# Patient Record
Sex: FEMALE | Race: WHITE | NOT HISPANIC OR LATINO | Employment: PART TIME | URBAN - METROPOLITAN AREA
[De-identification: names, ages, dates, MRNs, and addresses within clinical notes are randomized per-mention and may not be internally consistent; named-entity substitution may affect disease eponyms.]

---

## 2018-09-12 ENCOUNTER — TELEPHONE (OUTPATIENT)
Dept: FAMILY MEDICINE | Facility: CLINIC | Age: 28
End: 2018-09-12

## 2018-09-12 ENCOUNTER — OFFICE VISIT (OUTPATIENT)
Dept: FAMILY MEDICINE | Facility: CLINIC | Age: 28
End: 2018-09-12
Payer: OTHER GOVERNMENT

## 2018-09-12 VITALS
HEIGHT: 63 IN | HEART RATE: 72 BPM | BODY MASS INDEX: 24.34 KG/M2 | RESPIRATION RATE: 20 BRPM | SYSTOLIC BLOOD PRESSURE: 100 MMHG | WEIGHT: 137.38 LBS | DIASTOLIC BLOOD PRESSURE: 70 MMHG

## 2018-09-12 DIAGNOSIS — N92.6 MISSED PERIOD: Primary | ICD-10-CM

## 2018-09-12 PROCEDURE — 99203 OFFICE O/P NEW LOW 30 MIN: CPT | Mod: PBBFAC | Performed by: FAMILY MEDICINE

## 2018-09-12 PROCEDURE — 99999 PR PBB SHADOW E&M-NEW PATIENT-LVL III: CPT | Mod: PBBFAC,,, | Performed by: FAMILY MEDICINE

## 2018-09-12 PROCEDURE — 99203 OFFICE O/P NEW LOW 30 MIN: CPT | Mod: S$PBB,,, | Performed by: FAMILY MEDICINE

## 2018-09-12 NOTE — TELEPHONE ENCOUNTER
----- Message from Bertha Carbajal sent at 2018  3:25 PM CDT -----  Contact: Self  Teodora Weiner  MRN: [id number]  : 1990  PCP: Fariba Hudson  Home Phone      [phone]  Work Phone      Not on file.  Mobile          Not on file.      MESSAGE:   Patient would like to inform Dr. Hudson that her OB will be .    Phone:  [phone]

## 2018-09-12 NOTE — PROGRESS NOTES
Subjective:       Patient ID: Teodora Weiner is a 28 y.o. female.    Chief Complaint: Confirm pregnancy    HPI  28 year old femlae come in to 'confirm pregnancy.' She says her LMP was 7/25 and she has taken at home tests which are positive. She notes that over the last couple of days she is more nauseated. She has had some breast tenderness. No spotting, bleeding, discharge.    She has never been pregnant before. She is taking prenatal vitamins.    She is just moving here.    PMH, PSH, ALLERGIES, SH, FH reviewed in nurse's notes above  Medications reconciled in the nurse's notes      Review of Systems   Constitutional: Negative for chills and fever.   HENT: Negative for congestion, ear pain, postnasal drip, rhinorrhea, sore throat and trouble swallowing.    Eyes: Negative for redness and itching.   Respiratory: Negative for cough, shortness of breath and wheezing.    Cardiovascular: Negative for chest pain and palpitations.   Gastrointestinal: Negative for abdominal pain, diarrhea, nausea and vomiting.   Genitourinary: Positive for menstrual problem. Negative for dysuria and frequency.   Skin: Negative for rash.   Neurological: Negative for weakness and headaches.       Objective:      Physical Exam   Constitutional: She is oriented to person, place, and time. She appears well-developed. No distress.   HENT:   Head: Normocephalic and atraumatic.   Eyes: Conjunctivae are normal. Pupils are equal, round, and reactive to light.   Neck: Normal range of motion. Neck supple. No thyromegaly present.   Cardiovascular: Normal rate, regular rhythm, normal heart sounds and intact distal pulses.   Pulmonary/Chest: Effort normal and breath sounds normal. No respiratory distress. She has no wheezes.   Abdominal: Soft. Bowel sounds are normal. There is no tenderness.   Musculoskeletal: Normal range of motion. She exhibits no edema.   Lymphadenopathy:     She has no cervical adenopathy.   Neurological: She is alert and  oriented to person, place, and time.   Skin: Skin is warm and dry. No rash noted.   Psychiatric: She has a normal mood and affect. Her behavior is normal.   Nursing note and vitals reviewed.       Assessment/Plan:       Problem List Items Addressed This Visit     None      Visit Diagnoses     Missed period    -  Primary    Relevant Medications    prenatal 2-iron-folic acid-om3 29-1-250 mg Cmpk    Other Relevant Orders    POCT Urine Pregnancy    Ambulatory referral to Obstetrics / Gynecology        EDC - May 1, 2018 based on LMP, 7 WGA today.      RTC if condition acutely worsens or any other concerns, otherwise RTC as scheduled

## 2018-09-12 NOTE — PATIENT INSTRUCTIONS
Established Pregnancy, Normal Symptoms    You are pregnant and are having symptoms that worry you. During pregnancy, its normal to have many kinds of symptoms. Here is a list of common symptoms that happen during pregnancy.  Head and mouth  · Bleeding gums  · Dizziness and fainting  · Extra saliva  · Headaches  · Nosebleeds  · Skin color changes on your face  · Stuffy nose  · Mild blurriness of vision, especially if you wear contact lenses  Breasts  · Darkening of nipples  · Yellow or white discharge from the nipples  · Sore breasts and nipples  · Swollen breasts  Back, arms, and legs  · Back, hip, or thigh pain  · Leg cramps that come and go  · Numbness and tingling in your hands and fingers  · Swollen hands, legs, and feet  · Swollen leg veins  Belly (abdomen) and pelvis  · Constipation  · Feeling of pressure on your bladder and stomach  · Need to urinate often  · Gas and bloating  · Heartburn  · Anal itching, swelling, and bleeding (hemorrhoids)  · Leaking urine  · Mild pressure or cramping in your belly  · Nausea and vomiting throughout the day or night (morning sickness)  · Swollen belly  · Clear to white vaginal discharge  Other symptoms  · Dry, itchy skin  · Forgetfulness  · Less interest in sex  · Mood swings  · Tiredness  · Trouble sleeping  Home care  Here is information that may help relieve some common pregnancy symptoms.  Sore and swollen breasts  · Wear a support bra that fits properly.  Nausea and indigestion  · Eat smaller meals or snacks more often.  · Eat bland foods, such as bananas, crackers, or rice.  · Stay away from spicy, fatty, or fried foods.  · Stay away from alcohol, caffeine, and tobacco.  · Dont lie down right after eating.  · Raise your head with pillows when you lie down.  · Eat foods or beverages that have ginger. If you drink ginger ale, be sure to make sure it has real ginger and not just ginger flavoring.  Leg swelling and varicose veins  · Wear elastic support hose. Put your  feet up as often as possible.  Constipation  · Eat more fresh fruits and vegetables and more whole grains. Drink more clear liquids.  Joint and muscle pains  · Avoid heavy lifting.  · Pick things up by bending at your knees, not at your waist.  · Use acetaminophen for joint and muscle pain. Don't use aspirin, ibuprofen, or naproxen.  Mouth and nose dryness or bleeding  · Drink more liquids. Use a vaporizer or humidifier in your bedroom.  Dont take medicines or use remedies that your healthcare provider hasnt approved. If you have symptoms that are severe or sudden, call your healthcare provider.  Call 911  Call 911 if any of these occur:  · New chest, arm, shoulder, neck, or upper back pain  · Trouble breathing  · Severe belly pain or very heavy bleeding  · Severe lightheadedness, passing out, or fainting  · Rapid heart rate  · Confusion or difficulty waking up  When to seek medical advice  Call your healthcare provider right away if any of these occur:  · Burning or pain when you urinate  · Depression or severe anxiety  · Desire to eat or drink nonfood items such as paper, dirt, or cleaning products  · Diarrhea that lasts more than 24 hours  · Fast heartbeat or heart palpitations  · Fever of 100.4°F (38°C) or higher, or as directed by your healthcare provider  · You cant keep fluids down for 6 hours without vomiting  · Severe or ongoing vomiting  · Little or no urine  · Major vision changes  · Moderate or severe belly pain  · Severe back pain  · Severe constipation  · Severe cramping or swelling in a leg, especially if its just on one side  · Severe headache  · Sudden swelling of your face, hands, feet, or ankles  · Vaginal bleeding  · Very itchy skin that doesnt get better  Date Last Reviewed: 10/1/2016  © 3109-0126 [employer]. [address] [place] [address], [place] [address]. All rights reserved. This information is not intended as a substitute for professional medical care. Always follow your  healthcare professional's instructions.

## 2018-09-12 NOTE — TELEPHONE ENCOUNTER
Please put this referral into  website and have appt made for next week if possible - she will be 8wga next week and should be set up for intiial ob appt and u/s.  mh

## 2018-09-13 NOTE — TELEPHONE ENCOUNTER
JUAN JOSÉ PIRES MRN: [id number]    Date: 9/21/2018 Status: Cleo   Appt Time: 8:00 AM Length: 15   Visit Type: INITIAL OB ([facility]) [611] Copay: $0.00   Provider: April Brown MD       Notified

## 2018-09-21 ENCOUNTER — OFFICE VISIT (OUTPATIENT)
Dept: OBSTETRICS AND GYNECOLOGY | Facility: CLINIC | Age: 28
End: 2018-09-21
Payer: OTHER GOVERNMENT

## 2018-09-21 ENCOUNTER — LAB VISIT (OUTPATIENT)
Dept: LAB | Facility: HOSPITAL | Age: 28
End: 2018-09-21
Attending: OBSTETRICS & GYNECOLOGY
Payer: OTHER GOVERNMENT

## 2018-09-21 VITALS
BODY MASS INDEX: 24.63 KG/M2 | HEART RATE: 88 BPM | WEIGHT: 139 LBS | HEIGHT: 63 IN | DIASTOLIC BLOOD PRESSURE: 68 MMHG | RESPIRATION RATE: 10 BRPM | SYSTOLIC BLOOD PRESSURE: 110 MMHG

## 2018-09-21 DIAGNOSIS — Z11.3 SCREEN FOR STD (SEXUALLY TRANSMITTED DISEASE): ICD-10-CM

## 2018-09-21 DIAGNOSIS — N91.2 AMENORRHEA: Primary | ICD-10-CM

## 2018-09-21 DIAGNOSIS — N91.2 AMENORRHEA: ICD-10-CM

## 2018-09-21 DIAGNOSIS — Z32.01 POSITIVE PREGNANCY TEST: ICD-10-CM

## 2018-09-21 DIAGNOSIS — Z12.4 CERVICAL CANCER SCREENING: ICD-10-CM

## 2018-09-21 PROBLEM — Z67.91 RH NEGATIVE STATUS DURING PREGNANCY: Status: ACTIVE | Noted: 2018-09-21

## 2018-09-21 PROBLEM — O26.899 RH NEGATIVE STATUS DURING PREGNANCY: Status: ACTIVE | Noted: 2018-09-21

## 2018-09-21 LAB
ABO + RH BLD: NORMAL
ALBUMIN SERPL BCP-MCNC: 3.8 G/DL
ALP SERPL-CCNC: 28 U/L
ALT SERPL W/O P-5'-P-CCNC: 17 U/L
ANION GAP SERPL CALC-SCNC: 10 MMOL/L
AST SERPL-CCNC: 15 U/L
BASOPHILS # BLD AUTO: 0.02 K/UL
BASOPHILS NFR BLD: 0.3 %
BILIRUB SERPL-MCNC: 0.4 MG/DL
BLD GP AB SCN CELLS X3 SERPL QL: NORMAL
BUN SERPL-MCNC: 9 MG/DL
CALCIUM SERPL-MCNC: 9.2 MG/DL
CHLORIDE SERPL-SCNC: 105 MMOL/L
CO2 SERPL-SCNC: 22 MMOL/L
CREAT SERPL-MCNC: 0.7 MG/DL
DIFFERENTIAL METHOD: ABNORMAL
EOSINOPHIL # BLD AUTO: 0.1 K/UL
EOSINOPHIL NFR BLD: 1.2 %
ERYTHROCYTE [DISTWIDTH] IN BLOOD BY AUTOMATED COUNT: 12.6 %
EST. GFR  (AFRICAN AMERICAN): >60 ML/MIN/1.73 M^2
EST. GFR  (NON AFRICAN AMERICAN): >60 ML/MIN/1.73 M^2
GLUCOSE SERPL-MCNC: 85 MG/DL
HCT VFR BLD AUTO: 36.4 %
HGB BLD-MCNC: 12 G/DL
LYMPHOCYTES # BLD AUTO: 1.3 K/UL
LYMPHOCYTES NFR BLD: 18.6 %
MCH RBC QN AUTO: 29.3 PG
MCHC RBC AUTO-ENTMCNC: 33 G/DL
MCV RBC AUTO: 89 FL
MONOCYTES # BLD AUTO: 0.6 K/UL
MONOCYTES NFR BLD: 9.3 %
NEUTROPHILS # BLD AUTO: 4.8 K/UL
NEUTROPHILS NFR BLD: 70.6 %
PLATELET # BLD AUTO: 240 K/UL
PMV BLD AUTO: 10.6 FL
POTASSIUM SERPL-SCNC: 3.9 MMOL/L
PROT SERPL-MCNC: 6.2 G/DL
RBC # BLD AUTO: 4.1 M/UL
RPR SER QL: NORMAL
SODIUM SERPL-SCNC: 137 MMOL/L
WBC # BLD AUTO: 6.79 K/UL

## 2018-09-21 PROCEDURE — 99213 OFFICE O/P EST LOW 20 MIN: CPT | Mod: PBBFAC | Performed by: OBSTETRICS & GYNECOLOGY

## 2018-09-21 PROCEDURE — 81220 CFTR GENE COM VARIANTS: CPT

## 2018-09-21 PROCEDURE — 86901 BLOOD TYPING SEROLOGIC RH(D): CPT

## 2018-09-21 PROCEDURE — 80053 COMPREHEN METABOLIC PANEL: CPT

## 2018-09-21 PROCEDURE — 86703 HIV-1/HIV-2 1 RESULT ANTBDY: CPT

## 2018-09-21 PROCEDURE — 76817 TRANSVAGINAL US OBSTETRIC: CPT | Mod: 26,S$PBB,, | Performed by: OBSTETRICS & GYNECOLOGY

## 2018-09-21 PROCEDURE — 87491 CHLMYD TRACH DNA AMP PROBE: CPT

## 2018-09-21 PROCEDURE — 86592 SYPHILIS TEST NON-TREP QUAL: CPT

## 2018-09-21 PROCEDURE — 99204 OFFICE O/P NEW MOD 45 MIN: CPT | Mod: 25,S$PBB,, | Performed by: OBSTETRICS & GYNECOLOGY

## 2018-09-21 PROCEDURE — 76817 TRANSVAGINAL US OBSTETRIC: CPT | Mod: PBBFAC | Performed by: OBSTETRICS & GYNECOLOGY

## 2018-09-21 PROCEDURE — 36415 COLL VENOUS BLD VENIPUNCTURE: CPT

## 2018-09-21 PROCEDURE — 99999 PR PBB SHADOW E&M-EST. PATIENT-LVL III: CPT | Mod: PBBFAC,,, | Performed by: OBSTETRICS & GYNECOLOGY

## 2018-09-21 PROCEDURE — 85025 COMPLETE CBC W/AUTO DIFF WBC: CPT

## 2018-09-21 PROCEDURE — 86762 RUBELLA ANTIBODY: CPT

## 2018-09-21 PROCEDURE — 88175 CYTOPATH C/V AUTO FLUID REDO: CPT

## 2018-09-21 PROCEDURE — 87340 HEPATITIS B SURFACE AG IA: CPT

## 2018-09-21 NOTE — PROGRESS NOTES
Subjective:   Patient ID: Teodora Weiner is a 28 y.o. y.o. female.     Chief Complaint: Missed Menses       History of Present Illness:    Teodora presents today complaining of amenorrhea. Patient's last menstrual period was 07/25/2018.. Prior menstrual cycles have been regular. She reports positive home pregnancy test. UPT is positive.     Patient reports some breast tenderness and nausea at the beginning of the pregnancy but that has now resolved. Denies any cramping or bleeding. No constipation. Mild fatigue.       History reviewed. No pertinent past medical history.  Past Surgical History:   Procedure Laterality Date    CYST REMOVAL       Social History     Socioeconomic History    Marital status:      Spouse name: None    Number of children: 0    Years of education: None    Highest education level: None   Social Needs    Financial resource strain: None    Food insecurity - worry: None    Food insecurity - inability: None    Transportation needs - medical: None    Transportation needs - non-medical: None   Occupational History    Occupation:       Employer: [employer]   Tobacco Use    Smoking status: Never Smoker    Smokeless tobacco: Never Used   Substance and Sexual Activity    Alcohol use: No     Frequency: Never    Drug use: No    Sexual activity: Yes     Partners: Male     Birth control/protection: None     Comment:    Other Topics Concern    None   Social History Narrative    None     Family History   Problem Relation Age of Onset    Asthma Mother     Hypertension Father     Heart disease Father     Heart disease Brother      OB History   No data available         ROS:   Constitutional: Negative for chills, fever and weight loss.   HENT: Negative for congestion, ear discharge, hearing loss and nosebleeds.   Eyes: Negative for blurred vision and double vision.   Respiratory: Negative for cough, hemoptysis, sputum production and  shortness of breath.   Cardiovascular: Negative for chest pain, palpitations and orthopnea.   Gastrointestinal: Negative for abdominal pain, heartburn, nausea and vomiting.   Genitourinary: Negative for dysuria, frequency, hematuria and urgency.   Musculoskeletal: Negative for back pain, myalgias and neck pain.   Skin: Negative for itching and rash.   Neurological: Negative for dizziness, tingling, tremors and headaches.   Endo/Heme/Allergies: Negative for environmental allergies and polydipsia. Does not bruise/bleed easily.   Psychiatric/Behavioral: Negative for depression, hallucinations and substance abuse. The patient is not nervous/anxious.       Objective:   Vital Signs:  Vitals:    09/21/18 0811   BP: 110/68   Pulse: 88   Resp: 10         Physical Exam:  General:  alert, cooperative, appears stated age   Skin:  Skin color, texture, turgor normal. No rashes or lesions   HEENT:  conjunctivae/corneas clear. PERRL.   Neck: supple, trachea midline, no adenopathy or thyromegally   Respiratory:  clear to auscultation bilaterally   Heart:  regular rate and rhythm, S1, S2 normal, no murmur, click, rub or gallop   Breasts:  no discharge, erythema, or tenderness   Abdomen:  normal findings: bowel sounds normal, no masses palpable, no organomegaly and soft, non-tender   Pelvis: External genitalia: normal general appearance  Urinary system: urethral meatus normal, bladder nontender  Vaginal: normal mucosa without prolapse or lesions  Cervix: normal appearance  Uterus: normal size, shape, position  Adnexa: normal size, nontender bilaterally   Extremities: Normal ROM; no edema, no cyanosis   Neurologial: Normal strength and tone. No focal numbness or weakness. Reflexes 2+ and equal.   Psychiatric: normal mood, speech, dress, and thought processes     Assessment:      1. Amenorrhea    2. Positive pregnancy test    3. Screen for STD (sexually transmitted disease)    4. Cervical cancer screening          Plan:       Amenorrhea  -     US OB/GYN Procedure ([facility]) - Extended List - Future; Future    Positive pregnancy test  -     Comprehensive metabolic panel; Future; Expected date: 09/21/2018  -     Cystic Fibrosis Mutation Panel; Future; Expected date: 09/21/2018  -     RPR; Future; Expected date: 09/21/2018  -     Rubella antibody, IgG; Future; Expected date: 09/21/2018  -     Hepatitis B surface antigen; Future; Expected date: 09/21/2018  -     HIV-1 and HIV-2 antibodies; Future; Expected date: 09/21/2018  -     CBC auto differential; Future; Expected date: 09/21/2018  -     Type & Screen - Ob Profile; Future; Expected date: 09/21/2018    Screen for STD (sexually transmitted disease)  -     C. trachomatis/N. gonorrhoeae by AMP DNA    Cervical cancer screening  -     Liquid-based pap smear, screening      Dating scan today  Labs  Pap  GC/CT  PNV  RTC in 4 weeks    Patient was counseled today on proper weight gain based on the [place] of [facility]'s recommendations based on her pre-pregnancy weight. Discussed foods to avoid in pregnancy (i.e. sushi, fish that are high in mercury, deli meat, and unpasteurized cheeses). Discussed prenatal vitamin options (i.e. stool softener, DHA). She was also counseled on safe, healthy behavior as well as medications safe in pregnancy.

## 2018-09-21 NOTE — LETTER
September 21, 2018      Fariba Hudson MD  [address]  [facility] [address]           [facility]  [address]  [facility] [address]  Phone: [phone]          Patient: Teodora Weiner   MR Number: [id number]   YOB: 1990   Date of Visit: 9/21/2018       Dear Dr. Fariba Hudson:    Thank you for referring Teodora Weiner to me for evaluation. Attached you will find relevant portions of my assessment and plan of care.    If you have questions, please do not hesitate to call me. I look forward to following Teodora Weiner along with you.    Sincerely,    April Brown MD    Enclosure  CC:  No Recipients    If you would like to receive this communication electronically, please contact [email or web address] or [phone] to request more information on [employer] access.    For providers and/or their staff who would like to refer a patient to [facility], please contact us through our one-stop-shop provider referral line, [facility], at [phone].    If you feel you have received this communication in error or would no longer like to receive these types of communications, please e-mail [email or web address]

## 2018-09-23 LAB
C TRACH DNA SPEC QL NAA+PROBE: NOT DETECTED
N GONORRHOEA DNA SPEC QL NAA+PROBE: NOT DETECTED

## 2018-09-24 LAB
CFTR MUT ANL BLD/T: NORMAL
HBV SURFACE AG SERPL QL IA: NEGATIVE
HIV 1+2 AB+HIV1 P24 AG SERPL QL IA: NEGATIVE
RUBV IGG SER-ACNC: 9.3 IU/ML
RUBV IGG SER-IMP: ABNORMAL

## 2018-09-25 PROBLEM — O09.899 RUBELLA NON-IMMUNE STATUS, ANTEPARTUM: Status: ACTIVE | Noted: 2018-09-25

## 2018-09-25 PROBLEM — Z28.39 RUBELLA NON-IMMUNE STATUS, ANTEPARTUM: Status: ACTIVE | Noted: 2018-09-25

## 2018-10-19 ENCOUNTER — INITIAL PRENATAL (OUTPATIENT)
Dept: OBSTETRICS AND GYNECOLOGY | Facility: CLINIC | Age: 28
End: 2018-10-19
Payer: OTHER GOVERNMENT

## 2018-10-19 VITALS
SYSTOLIC BLOOD PRESSURE: 132 MMHG | BODY MASS INDEX: 23.95 KG/M2 | HEART RATE: 74 BPM | WEIGHT: 135.19 LBS | DIASTOLIC BLOOD PRESSURE: 70 MMHG

## 2018-10-19 DIAGNOSIS — O26.891 RH NEGATIVE STATUS DURING PREGNANCY IN FIRST TRIMESTER: ICD-10-CM

## 2018-10-19 DIAGNOSIS — Z23 NEED FOR INFLUENZA VACCINATION: Primary | ICD-10-CM

## 2018-10-19 DIAGNOSIS — Z28.39 RUBELLA NON-IMMUNE STATUS, ANTEPARTUM: ICD-10-CM

## 2018-10-19 DIAGNOSIS — Z67.91 RH NEGATIVE STATUS DURING PREGNANCY IN FIRST TRIMESTER: ICD-10-CM

## 2018-10-19 DIAGNOSIS — Z34.01 ENCOUNTER FOR SUPERVISION OF NORMAL FIRST PREGNANCY IN FIRST TRIMESTER: Primary | ICD-10-CM

## 2018-10-19 DIAGNOSIS — Z3A.12 12 WEEKS GESTATION OF PREGNANCY: ICD-10-CM

## 2018-10-19 DIAGNOSIS — O09.899 RUBELLA NON-IMMUNE STATUS, ANTEPARTUM: ICD-10-CM

## 2018-10-19 PROBLEM — Z34.91 ENCOUNTER FOR SUPERVISION OF NORMAL PREGNANCY IN FIRST TRIMESTER: Status: ACTIVE | Noted: 2018-10-19

## 2018-10-19 PROCEDURE — 99212 OFFICE O/P EST SF 10 MIN: CPT | Mod: PBBFAC | Performed by: OBSTETRICS & GYNECOLOGY

## 2018-10-19 PROCEDURE — 0502F SUBSEQUENT PRENATAL CARE: CPT | Mod: ,,, | Performed by: OBSTETRICS & GYNECOLOGY

## 2018-10-19 PROCEDURE — 90686 IIV4 VACC NO PRSV 0.5 ML IM: CPT | Mod: PBBFAC

## 2018-10-19 PROCEDURE — 99999 PR PBB SHADOW E&M-EST. PATIENT-LVL II: CPT | Mod: PBBFAC,,, | Performed by: OBSTETRICS & GYNECOLOGY

## 2018-10-19 NOTE — PROGRESS NOTES
Reviewed prenatal labs with patient. Reviewed dating criteria. Discussed proper nutrition and weight gain in pregnancy. No vaginal bleeding or cramping noted. SAB precautions discussed. Patient to RTC in 4 weeks.     Flu shot today.     Initial ob packet supplied to patient. Contents supplied and discussed include medications safe in pregnancy, pregnancy A to Z, class schedule, pregnancy checklist, car seat safety, and handout on skin to skin and breastfeeding. Coeffective vera card supplied and discussed.

## 2018-11-16 ENCOUNTER — ROUTINE PRENATAL (OUTPATIENT)
Dept: OBSTETRICS AND GYNECOLOGY | Facility: CLINIC | Age: 28
End: 2018-11-16
Payer: OTHER GOVERNMENT

## 2018-11-16 ENCOUNTER — LAB VISIT (OUTPATIENT)
Dept: LAB | Facility: HOSPITAL | Age: 28
End: 2018-11-16
Attending: OBSTETRICS & GYNECOLOGY
Payer: OTHER GOVERNMENT

## 2018-11-16 VITALS
HEART RATE: 67 BPM | WEIGHT: 137.81 LBS | SYSTOLIC BLOOD PRESSURE: 104 MMHG | DIASTOLIC BLOOD PRESSURE: 62 MMHG | BODY MASS INDEX: 24.41 KG/M2

## 2018-11-16 DIAGNOSIS — Z36.3 ENCOUNTER FOR ROUTINE SCREENING FOR MALFORMATION USING ULTRASONICS: ICD-10-CM

## 2018-11-16 DIAGNOSIS — Z28.39 RUBELLA NON-IMMUNE STATUS, ANTEPARTUM: ICD-10-CM

## 2018-11-16 DIAGNOSIS — Z34.01 ENCOUNTER FOR SUPERVISION OF NORMAL FIRST PREGNANCY IN FIRST TRIMESTER: Primary | ICD-10-CM

## 2018-11-16 DIAGNOSIS — O26.892 RH NEGATIVE STATUS DURING PREGNANCY IN SECOND TRIMESTER: ICD-10-CM

## 2018-11-16 DIAGNOSIS — Z3A.16 16 WEEKS GESTATION OF PREGNANCY: ICD-10-CM

## 2018-11-16 DIAGNOSIS — Z67.91 RH NEGATIVE STATUS DURING PREGNANCY IN SECOND TRIMESTER: ICD-10-CM

## 2018-11-16 DIAGNOSIS — Z34.01 ENCOUNTER FOR SUPERVISION OF NORMAL FIRST PREGNANCY IN FIRST TRIMESTER: ICD-10-CM

## 2018-11-16 DIAGNOSIS — O09.899 RUBELLA NON-IMMUNE STATUS, ANTEPARTUM: ICD-10-CM

## 2018-11-16 PROCEDURE — 0502F SUBSEQUENT PRENATAL CARE: CPT | Mod: ,,, | Performed by: OBSTETRICS & GYNECOLOGY

## 2018-11-16 PROCEDURE — 99999 PR PBB SHADOW E&M-EST. PATIENT-LVL II: CPT | Mod: PBBFAC,,, | Performed by: OBSTETRICS & GYNECOLOGY

## 2018-11-16 PROCEDURE — 81511 FTL CGEN ABNOR FOUR ANAL: CPT

## 2018-11-16 PROCEDURE — 36415 COLL VENOUS BLD VENIPUNCTURE: CPT

## 2018-11-16 PROCEDURE — 99212 OFFICE O/P EST SF 10 MIN: CPT | Mod: PBBFAC | Performed by: OBSTETRICS & GYNECOLOGY

## 2018-11-16 NOTE — PROGRESS NOTES
Patient doing well. No vaginal bleeding or cramping noted. Discussed the need for an anatomy scan between 18-20 weeks. Discussed quad screen; ordered today. RTC in 4 weeks.     Coffective counseling sheet Get Ready discussed with mother. Reinforced avoiding induction of labor unless medically indicated as well as comfort measures during labor.  Encouraged mother to download Coffective mobile vera if she has not already done so. Mother verbalizes understanding.

## 2018-11-19 LAB
# FETUSES US: NORMAL
2ND TRIMESTER 4 SCREEN PNL SERPL: NEGATIVE
2ND TRIMESTER 4 SCREEN SERPL-IMP: NORMAL
AFP MOM SERPL: 0.88
AFP SERPL-MCNC: 31.8 NG/ML
AGE AT DELIVERY: 27
B-HCG MOM SERPL: 2.49
B-HCG SERPL-ACNC: 92.1 IU/ML
FET TS 21 RISK FROM MAT AGE: NORMAL
GA (DAYS): 1 D
GA (WEEKS): 16 WK
GA METHOD: NORMAL
IDDM PATIENT QL: NORMAL
INHIBIN A MOM SERPL: 1.24
INHIBIN A SERPL-MCNC: 214.5 PG/ML
SMOKING STATUS FTND: NO
TS 18 RISK FETUS: NORMAL
TS 21 RISK FETUS: NORMAL
U ESTRIOL MOM SERPL: 0.91
U ESTRIOL SERPL-MCNC: 0.78 NG/ML

## 2018-12-14 ENCOUNTER — ROUTINE PRENATAL (OUTPATIENT)
Dept: OBSTETRICS AND GYNECOLOGY | Facility: CLINIC | Age: 28
End: 2018-12-14
Payer: OTHER GOVERNMENT

## 2018-12-14 VITALS
BODY MASS INDEX: 24.87 KG/M2 | WEIGHT: 140.38 LBS | HEART RATE: 68 BPM | DIASTOLIC BLOOD PRESSURE: 60 MMHG | SYSTOLIC BLOOD PRESSURE: 102 MMHG

## 2018-12-14 DIAGNOSIS — Z67.91 RH NEGATIVE STATUS DURING PREGNANCY IN SECOND TRIMESTER: ICD-10-CM

## 2018-12-14 DIAGNOSIS — Z28.39 RUBELLA NON-IMMUNE STATUS, ANTEPARTUM: ICD-10-CM

## 2018-12-14 DIAGNOSIS — Z36.3 ENCOUNTER FOR ROUTINE SCREENING FOR MALFORMATION USING ULTRASONICS: ICD-10-CM

## 2018-12-14 DIAGNOSIS — Z3A.20 20 WEEKS GESTATION OF PREGNANCY: ICD-10-CM

## 2018-12-14 DIAGNOSIS — O26.892 RH NEGATIVE STATUS DURING PREGNANCY IN SECOND TRIMESTER: ICD-10-CM

## 2018-12-14 DIAGNOSIS — O09.899 RUBELLA NON-IMMUNE STATUS, ANTEPARTUM: ICD-10-CM

## 2018-12-14 DIAGNOSIS — Z34.02 ENCOUNTER FOR SUPERVISION OF NORMAL FIRST PREGNANCY IN SECOND TRIMESTER: Primary | ICD-10-CM

## 2018-12-14 PROBLEM — Z34.92 ENCOUNTER FOR SUPERVISION OF NORMAL PREGNANCY IN SECOND TRIMESTER: Status: ACTIVE | Noted: 2018-10-19

## 2018-12-14 PROCEDURE — 76805 OB US >/= 14 WKS SNGL FETUS: CPT | Mod: 26,S$PBB,, | Performed by: OBSTETRICS & GYNECOLOGY

## 2018-12-14 PROCEDURE — 0502F SUBSEQUENT PRENATAL CARE: CPT | Mod: ,,, | Performed by: OBSTETRICS & GYNECOLOGY

## 2018-12-14 PROCEDURE — 76805 OB US >/= 14 WKS SNGL FETUS: CPT | Mod: PBBFAC | Performed by: OBSTETRICS & GYNECOLOGY

## 2018-12-14 PROCEDURE — 99999 PR PBB SHADOW E&M-EST. PATIENT-LVL III: CPT | Mod: PBBFAC,,, | Performed by: OBSTETRICS & GYNECOLOGY

## 2018-12-14 PROCEDURE — 99213 OFFICE O/P EST LOW 20 MIN: CPT | Mod: PBBFAC | Performed by: OBSTETRICS & GYNECOLOGY

## 2018-12-14 NOTE — PROGRESS NOTES
Patient with no complaints. Denies vaginal bleeding or cramping.  Good FM. Discussed with patient having glucose testing for gestational diabetes preformed between 24-28 weeks. RTC in 4 weeks.    Anatomy scan preformed today andmary WNL.     Coffective counseling sheet Learn Your Baby and Protect Breastfeeding discussed with mother. Instructed regarding feeding cues and methods to calm baby. Encouraged mother to download Coffective mobile vera if she has not already done so.  Mother verbalized understanding.

## 2019-01-11 ENCOUNTER — ROUTINE PRENATAL (OUTPATIENT)
Dept: OBSTETRICS AND GYNECOLOGY | Facility: CLINIC | Age: 29
End: 2019-01-11
Payer: OTHER GOVERNMENT

## 2019-01-11 VITALS
BODY MASS INDEX: 25.15 KG/M2 | HEART RATE: 72 BPM | DIASTOLIC BLOOD PRESSURE: 62 MMHG | SYSTOLIC BLOOD PRESSURE: 110 MMHG | WEIGHT: 142 LBS

## 2019-01-11 DIAGNOSIS — Z28.39 RUBELLA NON-IMMUNE STATUS, ANTEPARTUM: ICD-10-CM

## 2019-01-11 DIAGNOSIS — O09.899 RUBELLA NON-IMMUNE STATUS, ANTEPARTUM: ICD-10-CM

## 2019-01-11 DIAGNOSIS — O26.892 RH NEGATIVE STATUS DURING PREGNANCY IN SECOND TRIMESTER: ICD-10-CM

## 2019-01-11 DIAGNOSIS — Z67.91 RH NEGATIVE STATUS DURING PREGNANCY IN SECOND TRIMESTER: ICD-10-CM

## 2019-01-11 DIAGNOSIS — Z34.02 ENCOUNTER FOR SUPERVISION OF NORMAL FIRST PREGNANCY IN SECOND TRIMESTER: Primary | ICD-10-CM

## 2019-01-11 DIAGNOSIS — Z3A.24 24 WEEKS GESTATION OF PREGNANCY: ICD-10-CM

## 2019-01-11 PROCEDURE — 0502F SUBSEQUENT PRENATAL CARE: CPT | Mod: ,,, | Performed by: OBSTETRICS & GYNECOLOGY

## 2019-01-11 PROCEDURE — 99212 OFFICE O/P EST SF 10 MIN: CPT | Mod: PBBFAC | Performed by: OBSTETRICS & GYNECOLOGY

## 2019-01-11 PROCEDURE — 99999 PR PBB SHADOW E&M-EST. PATIENT-LVL II: CPT | Mod: PBBFAC,,, | Performed by: OBSTETRICS & GYNECOLOGY

## 2019-01-11 PROCEDURE — 99999 PR PBB SHADOW E&M-EST. PATIENT-LVL II: ICD-10-PCS | Mod: PBBFAC,,, | Performed by: OBSTETRICS & GYNECOLOGY

## 2019-01-11 PROCEDURE — 0502F PR SUBSEQUENT PRENATAL CARE: ICD-10-PCS | Mod: ,,, | Performed by: OBSTETRICS & GYNECOLOGY

## 2019-01-11 NOTE — PROGRESS NOTES
Patient doing well; no OB complaints. Discussed need for CBC/GTT/indirect brian at next visit. Will follow up in 3 weeks.     Packet with info for classes and breast pump supplied to patient.

## 2019-02-01 ENCOUNTER — LAB VISIT (OUTPATIENT)
Dept: LAB | Facility: HOSPITAL | Age: 29
End: 2019-02-01
Attending: OBSTETRICS & GYNECOLOGY
Payer: OTHER GOVERNMENT

## 2019-02-01 ENCOUNTER — ROUTINE PRENATAL (OUTPATIENT)
Dept: OBSTETRICS AND GYNECOLOGY | Facility: CLINIC | Age: 29
End: 2019-02-01
Payer: OTHER GOVERNMENT

## 2019-02-01 VITALS
HEART RATE: 84 BPM | DIASTOLIC BLOOD PRESSURE: 67 MMHG | BODY MASS INDEX: 25.69 KG/M2 | WEIGHT: 145 LBS | SYSTOLIC BLOOD PRESSURE: 102 MMHG

## 2019-02-01 DIAGNOSIS — Z67.91 RH NEGATIVE STATUS DURING PREGNANCY IN SECOND TRIMESTER: ICD-10-CM

## 2019-02-01 DIAGNOSIS — Z28.39 RUBELLA NON-IMMUNE STATUS, ANTEPARTUM: ICD-10-CM

## 2019-02-01 DIAGNOSIS — Z34.82 ENCOUNTER FOR SUPERVISION OF OTHER NORMAL PREGNANCY IN SECOND TRIMESTER: ICD-10-CM

## 2019-02-01 DIAGNOSIS — O09.899 RUBELLA NON-IMMUNE STATUS, ANTEPARTUM: ICD-10-CM

## 2019-02-01 DIAGNOSIS — Z34.82 ENCOUNTER FOR SUPERVISION OF OTHER NORMAL PREGNANCY IN SECOND TRIMESTER: Primary | ICD-10-CM

## 2019-02-01 DIAGNOSIS — O26.892 RH NEGATIVE STATUS DURING PREGNANCY IN SECOND TRIMESTER: ICD-10-CM

## 2019-02-01 LAB
BASOPHILS # BLD AUTO: 0.02 K/UL
BASOPHILS NFR BLD: 0.2 %
BLD GP AB SCN CELLS X3 SERPL QL: NORMAL
DIFFERENTIAL METHOD: ABNORMAL
EOSINOPHIL # BLD AUTO: 0.1 K/UL
EOSINOPHIL NFR BLD: 1.1 %
ERYTHROCYTE [DISTWIDTH] IN BLOOD BY AUTOMATED COUNT: 12.4 %
GLUCOSE SERPL-MCNC: 124 MG/DL
HCT VFR BLD AUTO: 33.7 %
HGB BLD-MCNC: 11 G/DL
LYMPHOCYTES # BLD AUTO: 1.4 K/UL
LYMPHOCYTES NFR BLD: 13.7 %
MCH RBC QN AUTO: 30.2 PG
MCHC RBC AUTO-ENTMCNC: 32.6 G/DL
MCV RBC AUTO: 93 FL
MONOCYTES # BLD AUTO: 0.6 K/UL
MONOCYTES NFR BLD: 5.9 %
NEUTROPHILS # BLD AUTO: 8 K/UL
NEUTROPHILS NFR BLD: 79.1 %
PLATELET # BLD AUTO: 222 K/UL
PMV BLD AUTO: 10.3 FL
RBC # BLD AUTO: 3.64 M/UL
WBC # BLD AUTO: 10.14 K/UL

## 2019-02-01 PROCEDURE — 86850 RBC ANTIBODY SCREEN: CPT

## 2019-02-01 PROCEDURE — 0502F SUBSEQUENT PRENATAL CARE: CPT | Mod: ,,, | Performed by: OBSTETRICS & GYNECOLOGY

## 2019-02-01 PROCEDURE — 36415 COLL VENOUS BLD VENIPUNCTURE: CPT

## 2019-02-01 PROCEDURE — 85025 COMPLETE CBC W/AUTO DIFF WBC: CPT

## 2019-02-01 PROCEDURE — 0502F PR SUBSEQUENT PRENATAL CARE: ICD-10-PCS | Mod: ,,, | Performed by: OBSTETRICS & GYNECOLOGY

## 2019-02-01 PROCEDURE — 99213 OFFICE O/P EST LOW 20 MIN: CPT | Mod: PBBFAC | Performed by: OBSTETRICS & GYNECOLOGY

## 2019-02-01 PROCEDURE — 99999 PR PBB SHADOW E&M-EST. PATIENT-LVL III: CPT | Mod: PBBFAC,,, | Performed by: OBSTETRICS & GYNECOLOGY

## 2019-02-01 PROCEDURE — 99999 PR PBB SHADOW E&M-EST. PATIENT-LVL III: ICD-10-PCS | Mod: PBBFAC,,, | Performed by: OBSTETRICS & GYNECOLOGY

## 2019-02-01 PROCEDURE — 82950 GLUCOSE TEST: CPT

## 2019-02-01 NOTE — PROGRESS NOTES
Patient with no complaints. Denies vaginal bleeding or contractions. Good FM. Tdap discussed and ordered today.  Discussed fetal kick count instructions with patient to monitor fetal movement. RTC in 2 weeks.    Coffective counseling sheet Keep Baby Close discussed with mother. Reinforced rooming in practices, continued skin to skin, and quiet hours as requested by mother.  Encouraged mother to download Coffective mobile vera if she has not already done so. Mother verbalizes understanding.

## 2019-02-07 ENCOUNTER — CLINICAL SUPPORT (OUTPATIENT)
Dept: OBSTETRICS AND GYNECOLOGY | Facility: CLINIC | Age: 29
End: 2019-02-07
Payer: OTHER GOVERNMENT

## 2019-02-07 DIAGNOSIS — Z67.91 RH NEGATIVE STATUS DURING PREGNANCY IN THIRD TRIMESTER: Primary | ICD-10-CM

## 2019-02-07 DIAGNOSIS — O26.893 RH NEGATIVE STATUS DURING PREGNANCY IN THIRD TRIMESTER: Primary | ICD-10-CM

## 2019-02-07 NOTE — PROGRESS NOTES
Rhogam given LUOQG. Pt tolerated well, instructed to wait 15 minutes to monitor for reaction. Voiced understanding.

## 2019-02-18 ENCOUNTER — ROUTINE PRENATAL (OUTPATIENT)
Dept: OBSTETRICS AND GYNECOLOGY | Facility: CLINIC | Age: 29
End: 2019-02-18
Payer: OTHER GOVERNMENT

## 2019-02-18 VITALS
HEART RATE: 72 BPM | WEIGHT: 148 LBS | BODY MASS INDEX: 26.22 KG/M2 | SYSTOLIC BLOOD PRESSURE: 112 MMHG | DIASTOLIC BLOOD PRESSURE: 67 MMHG

## 2019-02-18 DIAGNOSIS — Z67.91 RH NEGATIVE STATUS DURING PREGNANCY IN THIRD TRIMESTER: ICD-10-CM

## 2019-02-18 DIAGNOSIS — O09.899 RUBELLA NON-IMMUNE STATUS, ANTEPARTUM: ICD-10-CM

## 2019-02-18 DIAGNOSIS — Z34.83 ENCOUNTER FOR SUPERVISION OF OTHER NORMAL PREGNANCY IN THIRD TRIMESTER: Primary | ICD-10-CM

## 2019-02-18 DIAGNOSIS — Z23 NEED FOR TDAP VACCINATION: ICD-10-CM

## 2019-02-18 DIAGNOSIS — Z3A.29 29 WEEKS GESTATION OF PREGNANCY: ICD-10-CM

## 2019-02-18 DIAGNOSIS — Z28.39 RUBELLA NON-IMMUNE STATUS, ANTEPARTUM: ICD-10-CM

## 2019-02-18 DIAGNOSIS — O26.893 RH NEGATIVE STATUS DURING PREGNANCY IN THIRD TRIMESTER: ICD-10-CM

## 2019-02-18 PROBLEM — Z34.93 ENCOUNTER FOR SUPERVISION OF NORMAL PREGNANCY IN THIRD TRIMESTER: Status: ACTIVE | Noted: 2018-10-19

## 2019-02-18 PROCEDURE — 99999 PR PBB SHADOW E&M-EST. PATIENT-LVL II: ICD-10-PCS | Mod: PBBFAC,,, | Performed by: OBSTETRICS & GYNECOLOGY

## 2019-02-18 PROCEDURE — 0502F SUBSEQUENT PRENATAL CARE: CPT | Mod: ,,, | Performed by: OBSTETRICS & GYNECOLOGY

## 2019-02-18 PROCEDURE — 0502F PR SUBSEQUENT PRENATAL CARE: ICD-10-PCS | Mod: ,,, | Performed by: OBSTETRICS & GYNECOLOGY

## 2019-02-18 PROCEDURE — 90471 IMMUNIZATION ADMIN: CPT | Mod: PBBFAC

## 2019-02-18 PROCEDURE — 99999 PR PBB SHADOW E&M-EST. PATIENT-LVL II: CPT | Mod: PBBFAC,,, | Performed by: OBSTETRICS & GYNECOLOGY

## 2019-02-18 PROCEDURE — 99212 OFFICE O/P EST SF 10 MIN: CPT | Mod: PBBFAC | Performed by: OBSTETRICS & GYNECOLOGY

## 2019-02-18 NOTE — PROGRESS NOTES
Patient with no complaints. Denies vaginal bleeding or contractions. Good FM. Growth scan ordered for next visit.     Coffective Motivation Document discussed with mother. Reinforced benefits of breastfeeding, risk of formula, and cue based feedings. Encouraged mother to download [employer]here[employer] mobile vera if she has not already done so. Mother verbalizes understanding.

## 2019-02-18 NOTE — NURSING
Tdap given right deltoid. Pt tolerated well. Instructed to wait 15 minutes to monitor for reaction. Pt voiced understanding.

## 2019-03-04 ENCOUNTER — ROUTINE PRENATAL (OUTPATIENT)
Dept: OBSTETRICS AND GYNECOLOGY | Facility: CLINIC | Age: 29
End: 2019-03-04
Payer: OTHER GOVERNMENT

## 2019-03-04 VITALS
BODY MASS INDEX: 26.39 KG/M2 | DIASTOLIC BLOOD PRESSURE: 67 MMHG | WEIGHT: 149 LBS | SYSTOLIC BLOOD PRESSURE: 122 MMHG | HEART RATE: 70 BPM

## 2019-03-04 DIAGNOSIS — Z28.39 RUBELLA NON-IMMUNE STATUS, ANTEPARTUM: ICD-10-CM

## 2019-03-04 DIAGNOSIS — Z67.91 RH NEGATIVE STATUS DURING PREGNANCY IN THIRD TRIMESTER: ICD-10-CM

## 2019-03-04 DIAGNOSIS — O09.899 RUBELLA NON-IMMUNE STATUS, ANTEPARTUM: ICD-10-CM

## 2019-03-04 DIAGNOSIS — Z34.83 ENCOUNTER FOR SUPERVISION OF OTHER NORMAL PREGNANCY IN THIRD TRIMESTER: Primary | ICD-10-CM

## 2019-03-04 DIAGNOSIS — O26.893 RH NEGATIVE STATUS DURING PREGNANCY IN THIRD TRIMESTER: ICD-10-CM

## 2019-03-04 DIAGNOSIS — Z34.83 ENCOUNTER FOR SUPERVISION OF OTHER NORMAL PREGNANCY IN THIRD TRIMESTER: ICD-10-CM

## 2019-03-04 DIAGNOSIS — Z3A.31 31 WEEKS GESTATION OF PREGNANCY: ICD-10-CM

## 2019-03-04 PROCEDURE — 76816 OB US FOLLOW-UP PER FETUS: CPT | Mod: 26,S$PBB,, | Performed by: OBSTETRICS & GYNECOLOGY

## 2019-03-04 PROCEDURE — 0502F SUBSEQUENT PRENATAL CARE: CPT | Mod: ,,, | Performed by: OBSTETRICS & GYNECOLOGY

## 2019-03-04 PROCEDURE — 76816 OB US FOLLOW-UP PER FETUS: CPT | Mod: PBBFAC | Performed by: OBSTETRICS & GYNECOLOGY

## 2019-03-04 PROCEDURE — 99999 PR PBB SHADOW E&M-EST. PATIENT-LVL III: ICD-10-PCS | Mod: PBBFAC,,, | Performed by: OBSTETRICS & GYNECOLOGY

## 2019-03-04 PROCEDURE — 99213 OFFICE O/P EST LOW 20 MIN: CPT | Mod: PBBFAC,25 | Performed by: OBSTETRICS & GYNECOLOGY

## 2019-03-04 PROCEDURE — 0502F PR SUBSEQUENT PRENATAL CARE: ICD-10-PCS | Mod: ,,, | Performed by: OBSTETRICS & GYNECOLOGY

## 2019-03-04 PROCEDURE — 99999 PR PBB SHADOW E&M-EST. PATIENT-LVL III: CPT | Mod: PBBFAC,,, | Performed by: OBSTETRICS & GYNECOLOGY

## 2019-03-04 PROCEDURE — 76816 PR  US,PREGNANT UTERUS,F/U,TRANSABD APP: ICD-10-PCS | Mod: 26,S$PBB,, | Performed by: OBSTETRICS & GYNECOLOGY

## 2019-03-18 ENCOUNTER — ROUTINE PRENATAL (OUTPATIENT)
Dept: OBSTETRICS AND GYNECOLOGY | Facility: CLINIC | Age: 29
End: 2019-03-18
Payer: OTHER GOVERNMENT

## 2019-03-18 VITALS
SYSTOLIC BLOOD PRESSURE: 118 MMHG | DIASTOLIC BLOOD PRESSURE: 67 MMHG | WEIGHT: 153 LBS | BODY MASS INDEX: 27.1 KG/M2 | HEART RATE: 79 BPM

## 2019-03-18 DIAGNOSIS — O26.893 RH NEGATIVE STATUS DURING PREGNANCY IN THIRD TRIMESTER: ICD-10-CM

## 2019-03-18 DIAGNOSIS — O09.899 RUBELLA NON-IMMUNE STATUS, ANTEPARTUM: ICD-10-CM

## 2019-03-18 DIAGNOSIS — Z3A.33 33 WEEKS GESTATION OF PREGNANCY: ICD-10-CM

## 2019-03-18 DIAGNOSIS — Z28.39 RUBELLA NON-IMMUNE STATUS, ANTEPARTUM: ICD-10-CM

## 2019-03-18 DIAGNOSIS — Z34.83 ENCOUNTER FOR SUPERVISION OF OTHER NORMAL PREGNANCY IN THIRD TRIMESTER: Primary | ICD-10-CM

## 2019-03-18 DIAGNOSIS — Z67.91 RH NEGATIVE STATUS DURING PREGNANCY IN THIRD TRIMESTER: ICD-10-CM

## 2019-03-18 PROCEDURE — 0502F SUBSEQUENT PRENATAL CARE: CPT | Mod: ,,, | Performed by: OBSTETRICS & GYNECOLOGY

## 2019-03-18 PROCEDURE — 99999 PR PBB SHADOW E&M-EST. PATIENT-LVL II: CPT | Mod: PBBFAC,,, | Performed by: OBSTETRICS & GYNECOLOGY

## 2019-03-18 PROCEDURE — 99999 PR PBB SHADOW E&M-EST. PATIENT-LVL II: ICD-10-PCS | Mod: PBBFAC,,, | Performed by: OBSTETRICS & GYNECOLOGY

## 2019-03-18 PROCEDURE — 0502F PR SUBSEQUENT PRENATAL CARE: ICD-10-PCS | Mod: ,,, | Performed by: OBSTETRICS & GYNECOLOGY

## 2019-03-18 PROCEDURE — 99212 OFFICE O/P EST SF 10 MIN: CPT | Mod: PBBFAC | Performed by: OBSTETRICS & GYNECOLOGY

## 2019-03-29 ENCOUNTER — TELEPHONE (OUTPATIENT)
Dept: OBSTETRICS AND GYNECOLOGY | Facility: CLINIC | Age: 29
End: 2019-03-29

## 2019-03-29 DIAGNOSIS — Z34.93 ENCOUNTER FOR SUPERVISION OF NORMAL PREGNANCY IN THIRD TRIMESTER, UNSPECIFIED GRAVIDITY: Primary | ICD-10-CM

## 2019-04-01 ENCOUNTER — ROUTINE PRENATAL (OUTPATIENT)
Dept: OBSTETRICS AND GYNECOLOGY | Facility: CLINIC | Age: 29
End: 2019-04-01
Payer: OTHER GOVERNMENT

## 2019-04-01 VITALS
DIASTOLIC BLOOD PRESSURE: 78 MMHG | HEART RATE: 80 BPM | SYSTOLIC BLOOD PRESSURE: 126 MMHG | WEIGHT: 153 LBS | BODY MASS INDEX: 27.1 KG/M2

## 2019-04-01 DIAGNOSIS — Z28.39 RUBELLA NON-IMMUNE STATUS, ANTEPARTUM: ICD-10-CM

## 2019-04-01 DIAGNOSIS — O09.899 RUBELLA NON-IMMUNE STATUS, ANTEPARTUM: ICD-10-CM

## 2019-04-01 DIAGNOSIS — Z67.91 RH NEGATIVE STATUS DURING PREGNANCY IN THIRD TRIMESTER: ICD-10-CM

## 2019-04-01 DIAGNOSIS — O26.893 RH NEGATIVE STATUS DURING PREGNANCY IN THIRD TRIMESTER: ICD-10-CM

## 2019-04-01 DIAGNOSIS — Z3A.35 35 WEEKS GESTATION OF PREGNANCY: ICD-10-CM

## 2019-04-01 DIAGNOSIS — Z34.83 ENCOUNTER FOR SUPERVISION OF OTHER NORMAL PREGNANCY IN THIRD TRIMESTER: Primary | ICD-10-CM

## 2019-04-01 DIAGNOSIS — Z34.93 ENCOUNTER FOR SUPERVISION OF NORMAL PREGNANCY IN THIRD TRIMESTER, UNSPECIFIED GRAVIDITY: ICD-10-CM

## 2019-04-01 PROCEDURE — 99212 OFFICE O/P EST SF 10 MIN: CPT | Mod: PBBFAC,25 | Performed by: OBSTETRICS & GYNECOLOGY

## 2019-04-01 PROCEDURE — 76816 PR  US,PREGNANT UTERUS,F/U,TRANSABD APP: ICD-10-PCS | Mod: 26,S$PBB,, | Performed by: OBSTETRICS & GYNECOLOGY

## 2019-04-01 PROCEDURE — 76816 OB US FOLLOW-UP PER FETUS: CPT | Mod: 26,S$PBB,, | Performed by: OBSTETRICS & GYNECOLOGY

## 2019-04-01 PROCEDURE — 0502F SUBSEQUENT PRENATAL CARE: CPT | Mod: ,,, | Performed by: OBSTETRICS & GYNECOLOGY

## 2019-04-01 PROCEDURE — 76816 OB US FOLLOW-UP PER FETUS: CPT | Mod: PBBFAC | Performed by: OBSTETRICS & GYNECOLOGY

## 2019-04-01 PROCEDURE — 87081 CULTURE SCREEN ONLY: CPT

## 2019-04-01 PROCEDURE — 99999 PR PBB SHADOW E&M-EST. PATIENT-LVL II: CPT | Mod: PBBFAC,,, | Performed by: OBSTETRICS & GYNECOLOGY

## 2019-04-01 PROCEDURE — 99999 PR PBB SHADOW E&M-EST. PATIENT-LVL II: ICD-10-PCS | Mod: PBBFAC,,, | Performed by: OBSTETRICS & GYNECOLOGY

## 2019-04-01 PROCEDURE — 0502F PR SUBSEQUENT PRENATAL CARE: ICD-10-PCS | Mod: ,,, | Performed by: OBSTETRICS & GYNECOLOGY

## 2019-04-04 ENCOUNTER — TELEPHONE (OUTPATIENT)
Dept: OBSTETRICS AND GYNECOLOGY | Facility: CLINIC | Age: 29
End: 2019-04-04

## 2019-04-04 DIAGNOSIS — Z34.93 ENCOUNTER FOR SUPERVISION OF NORMAL PREGNANCY IN THIRD TRIMESTER, UNSPECIFIED GRAVIDITY: Primary | ICD-10-CM

## 2019-04-04 LAB — BACTERIA SPEC AEROBE CULT: NORMAL

## 2019-04-04 NOTE — TELEPHONE ENCOUNTER
Breast pump form received. Completed and signed. Faxed back to number provided. Scanned to chart.

## 2019-04-05 ENCOUNTER — CLINICAL SUPPORT (OUTPATIENT)
Dept: OBSTETRICS AND GYNECOLOGY | Facility: CLINIC | Age: 29
End: 2019-04-05
Payer: OTHER GOVERNMENT

## 2019-04-05 DIAGNOSIS — Z34.93 ENCOUNTER FOR SUPERVISION OF NORMAL PREGNANCY IN THIRD TRIMESTER, UNSPECIFIED GRAVIDITY: ICD-10-CM

## 2019-04-05 PROCEDURE — 59025 FETAL NON-STRESS TEST- FUTURE: ICD-10-PCS | Mod: 26,S$PBB,, | Performed by: OBSTETRICS & GYNECOLOGY

## 2019-04-05 PROCEDURE — 59025 FETAL NON-STRESS TEST: CPT | Mod: PBBFAC | Performed by: OBSTETRICS & GYNECOLOGY

## 2019-04-05 NOTE — PROCEDURES
Fetal non-stress test- Future  Date/Time: 4/5/2019 3:32 PM  Performed by: April Brown MD  Authorized by: April Brown MD     Nonstress Test:     Variability:  6-25 BPM    Decelerations:  None    Accelerations:  15 bpm    Acoustic Stimulator: No      Baseline:  140    Contractions:  Not present  Biophysical Profile:     Nonstress Test Interpretation: reactive      Overall Impression:  Reassuring

## 2019-04-05 NOTE — PROGRESS NOTES
Pt presented to clinic for NST. Monitored 20 min. Reviewed by Dr. Thompson. Pt discharged to home.

## 2019-04-09 ENCOUNTER — PROCEDURE VISIT (OUTPATIENT)
Dept: OBSTETRICS AND GYNECOLOGY | Facility: CLINIC | Age: 29
End: 2019-04-09
Payer: OTHER GOVERNMENT

## 2019-04-09 VITALS
BODY MASS INDEX: 27.63 KG/M2 | SYSTOLIC BLOOD PRESSURE: 120 MMHG | DIASTOLIC BLOOD PRESSURE: 78 MMHG | WEIGHT: 156 LBS | HEART RATE: 78 BPM

## 2019-04-09 DIAGNOSIS — Z67.91 RH NEGATIVE STATUS DURING PREGNANCY IN THIRD TRIMESTER: ICD-10-CM

## 2019-04-09 DIAGNOSIS — O09.899 RUBELLA NON-IMMUNE STATUS, ANTEPARTUM: ICD-10-CM

## 2019-04-09 DIAGNOSIS — O26.893 RH NEGATIVE STATUS DURING PREGNANCY IN THIRD TRIMESTER: ICD-10-CM

## 2019-04-09 DIAGNOSIS — O36.5930 POOR FETAL GROWTH AFFECTING MANAGEMENT OF MOTHER IN THIRD TRIMESTER, SINGLE OR UNSPECIFIED FETUS: ICD-10-CM

## 2019-04-09 DIAGNOSIS — Z34.93 ENCOUNTER FOR SUPERVISION OF NORMAL PREGNANCY IN THIRD TRIMESTER, UNSPECIFIED GRAVIDITY: ICD-10-CM

## 2019-04-09 DIAGNOSIS — Z3A.36 36 WEEKS GESTATION OF PREGNANCY: ICD-10-CM

## 2019-04-09 DIAGNOSIS — Z34.03 ENCOUNTER FOR SUPERVISION OF NORMAL FIRST PREGNANCY IN THIRD TRIMESTER: Primary | ICD-10-CM

## 2019-04-09 DIAGNOSIS — Z28.39 RUBELLA NON-IMMUNE STATUS, ANTEPARTUM: ICD-10-CM

## 2019-04-09 PROCEDURE — 99212 OFFICE O/P EST SF 10 MIN: CPT | Mod: PBBFAC,25 | Performed by: OBSTETRICS & GYNECOLOGY

## 2019-04-09 PROCEDURE — 76819 FETAL BIOPHYS PROFIL W/O NST: CPT | Mod: 26,S$PBB,, | Performed by: OBSTETRICS & GYNECOLOGY

## 2019-04-09 PROCEDURE — 99999 PR PBB SHADOW E&M-EST. PATIENT-LVL II: CPT | Mod: PBBFAC,,, | Performed by: OBSTETRICS & GYNECOLOGY

## 2019-04-09 PROCEDURE — 0502F SUBSEQUENT PRENATAL CARE: CPT | Mod: ,,, | Performed by: OBSTETRICS & GYNECOLOGY

## 2019-04-09 PROCEDURE — 99999 PR PBB SHADOW E&M-EST. PATIENT-LVL II: ICD-10-PCS | Mod: PBBFAC,,, | Performed by: OBSTETRICS & GYNECOLOGY

## 2019-04-09 PROCEDURE — 76819 FETAL BIOPHYS PROFIL W/O NST: CPT | Mod: PBBFAC | Performed by: OBSTETRICS & GYNECOLOGY

## 2019-04-09 PROCEDURE — 76819 PR US, OB, FETAL BIOPHYSICAL, W/O NST: ICD-10-PCS | Mod: 26,S$PBB,, | Performed by: OBSTETRICS & GYNECOLOGY

## 2019-04-09 PROCEDURE — 0502F PR SUBSEQUENT PRENATAL CARE: ICD-10-PCS | Mod: ,,, | Performed by: OBSTETRICS & GYNECOLOGY

## 2019-04-09 NOTE — PROCEDURES
Procedures   Obstetrical ultrasound completed today.  See report in imaging section of [facility].

## 2019-04-09 NOTE — PROGRESS NOTES
Patient with no complaints. Denies vaginal bleeding or contractions. Good FM. RTC in 1 week.     Patient having NSTs and BPPs weekly for fetal growth at 11%; repeat growth at 39 weeks.     BPP 8/8 today.

## 2019-04-12 ENCOUNTER — OFFICE VISIT (OUTPATIENT)
Dept: OBSTETRICS AND GYNECOLOGY | Facility: CLINIC | Age: 29
End: 2019-04-12
Payer: OTHER GOVERNMENT

## 2019-04-12 VITALS — HEART RATE: 64 BPM | SYSTOLIC BLOOD PRESSURE: 110 MMHG | DIASTOLIC BLOOD PRESSURE: 62 MMHG

## 2019-04-12 DIAGNOSIS — Z34.93 ENCOUNTER FOR SUPERVISION OF NORMAL PREGNANCY IN THIRD TRIMESTER, UNSPECIFIED GRAVIDITY: ICD-10-CM

## 2019-04-12 PROCEDURE — 99999 PR PBB SHADOW E&M-EST. PATIENT-LVL I: ICD-10-PCS | Mod: PBBFAC,,,

## 2019-04-12 PROCEDURE — 59025 FETAL NON-STRESS TEST- FUTURE: ICD-10-PCS | Mod: 26,S$PBB,, | Performed by: OBSTETRICS & GYNECOLOGY

## 2019-04-12 PROCEDURE — 99211 OFF/OP EST MAY X REQ PHY/QHP: CPT | Mod: PBBFAC

## 2019-04-12 PROCEDURE — 99999 PR PBB SHADOW E&M-EST. PATIENT-LVL I: CPT | Mod: PBBFAC,,,

## 2019-04-12 PROCEDURE — 99499 NO LOS: ICD-10-PCS | Mod: S$PBB,,, | Performed by: OBSTETRICS & GYNECOLOGY

## 2019-04-12 PROCEDURE — 59025 FETAL NON-STRESS TEST: CPT | Mod: PBBFAC | Performed by: OBSTETRICS & GYNECOLOGY

## 2019-04-12 PROCEDURE — 99499 UNLISTED E&M SERVICE: CPT | Mod: S$PBB,,, | Performed by: OBSTETRICS & GYNECOLOGY

## 2019-04-12 NOTE — PROGRESS NOTES
Urine dipped, No protein or glucose noted.  Pt reported to clinic for NST. Monitored for 20 minutes. Reviewed by Dr Thompson. Pt discharged to home.  /62

## 2019-04-12 NOTE — PROCEDURES
Fetal non-stress test- Future  Date/Time: 4/12/2019 12:27 PM  Performed by: April Brown MD  Authorized by: April Brown MD     Nonstress Test:     Variability:  6-25 BPM    Decelerations:  None    Accelerations:  15 bpm    Acoustic Stimulator: No      Baseline:  150    Uterine Irritability: No      Contractions:  Not present  Biophysical Profile:     Nonstress Test Interpretation: reactive      Overall Impression:  Reassuring

## 2019-04-16 ENCOUNTER — ROUTINE PRENATAL (OUTPATIENT)
Dept: OBSTETRICS AND GYNECOLOGY | Facility: CLINIC | Age: 29
End: 2019-04-16
Payer: OTHER GOVERNMENT

## 2019-04-16 VITALS
BODY MASS INDEX: 27.81 KG/M2 | SYSTOLIC BLOOD PRESSURE: 116 MMHG | WEIGHT: 157 LBS | HEART RATE: 80 BPM | DIASTOLIC BLOOD PRESSURE: 78 MMHG

## 2019-04-16 DIAGNOSIS — O26.893 RH NEGATIVE STATUS DURING PREGNANCY IN THIRD TRIMESTER: ICD-10-CM

## 2019-04-16 DIAGNOSIS — O36.5930 POOR FETAL GROWTH AFFECTING MANAGEMENT OF MOTHER IN THIRD TRIMESTER, SINGLE OR UNSPECIFIED FETUS: ICD-10-CM

## 2019-04-16 DIAGNOSIS — Z67.91 RH NEGATIVE STATUS DURING PREGNANCY IN THIRD TRIMESTER: ICD-10-CM

## 2019-04-16 DIAGNOSIS — Z28.39 RUBELLA NON-IMMUNE STATUS, ANTEPARTUM: ICD-10-CM

## 2019-04-16 DIAGNOSIS — Z34.93 ENCOUNTER FOR SUPERVISION OF NORMAL PREGNANCY IN THIRD TRIMESTER, UNSPECIFIED GRAVIDITY: ICD-10-CM

## 2019-04-16 DIAGNOSIS — O09.899 RUBELLA NON-IMMUNE STATUS, ANTEPARTUM: ICD-10-CM

## 2019-04-16 DIAGNOSIS — Z3A.37 37 WEEKS GESTATION OF PREGNANCY: ICD-10-CM

## 2019-04-16 DIAGNOSIS — Z34.03 ENCOUNTER FOR SUPERVISION OF NORMAL FIRST PREGNANCY IN THIRD TRIMESTER: Primary | ICD-10-CM

## 2019-04-16 PROCEDURE — 99999 PR PBB SHADOW E&M-EST. PATIENT-LVL II: ICD-10-PCS | Mod: PBBFAC,,, | Performed by: OBSTETRICS & GYNECOLOGY

## 2019-04-16 PROCEDURE — 0502F PR SUBSEQUENT PRENATAL CARE: ICD-10-PCS | Mod: ,,, | Performed by: OBSTETRICS & GYNECOLOGY

## 2019-04-16 PROCEDURE — 76819 PR US, OB, FETAL BIOPHYSICAL, W/O NST: ICD-10-PCS | Mod: 26,S$PBB,, | Performed by: OBSTETRICS & GYNECOLOGY

## 2019-04-16 PROCEDURE — 99999 PR PBB SHADOW E&M-EST. PATIENT-LVL II: CPT | Mod: PBBFAC,,, | Performed by: OBSTETRICS & GYNECOLOGY

## 2019-04-16 PROCEDURE — 76819 FETAL BIOPHYS PROFIL W/O NST: CPT | Mod: 26,S$PBB,, | Performed by: OBSTETRICS & GYNECOLOGY

## 2019-04-16 PROCEDURE — 99212 OFFICE O/P EST SF 10 MIN: CPT | Mod: PBBFAC | Performed by: OBSTETRICS & GYNECOLOGY

## 2019-04-16 PROCEDURE — 0502F SUBSEQUENT PRENATAL CARE: CPT | Mod: ,,, | Performed by: OBSTETRICS & GYNECOLOGY

## 2019-04-16 PROCEDURE — 76819 FETAL BIOPHYS PROFIL W/O NST: CPT | Mod: PBBFAC | Performed by: OBSTETRICS & GYNECOLOGY

## 2019-04-16 NOTE — PROGRESS NOTES
Patient with no complaints. Denies vaginal bleeding or contractions. Good FM. RTC in 1 week.     BPP today 8/8; NST scheduled later this week. Will do growth at next appt.

## 2019-04-16 NOTE — PROCEDURES
Procedures   Obstetrical ultrasound completed today.  See report in imaging section of [facility].

## 2019-04-18 ENCOUNTER — CLINICAL SUPPORT (OUTPATIENT)
Dept: OBSTETRICS AND GYNECOLOGY | Facility: CLINIC | Age: 29
End: 2019-04-18
Payer: OTHER GOVERNMENT

## 2019-04-18 DIAGNOSIS — Z34.93 ENCOUNTER FOR SUPERVISION OF NORMAL PREGNANCY IN THIRD TRIMESTER, UNSPECIFIED GRAVIDITY: ICD-10-CM

## 2019-04-18 PROCEDURE — 59025 FETAL NON-STRESS TEST- FUTURE: ICD-10-PCS | Mod: 26,S$PBB,, | Performed by: OBSTETRICS & GYNECOLOGY

## 2019-04-18 PROCEDURE — 59025 FETAL NON-STRESS TEST: CPT | Mod: PBBFAC | Performed by: OBSTETRICS & GYNECOLOGY

## 2019-04-18 NOTE — PROGRESS NOTES
Patient came to clinic for NST. Monitored patient for 20 minutes. NST reviewed by Dr. Thompson. Patient discharged home

## 2019-04-18 NOTE — PROCEDURES
Fetal non-stress test- Future  Date/Time: 4/18/2019 9:40 AM  Performed by: April Brown MD  Authorized by: April Brown MD     Nonstress Test:     Variability:  6-25 BPM    Decelerations:  None    Accelerations:  15 bpm    Baseline:  140    Uterine Irritability: No      Contractions:  Not present  Biophysical Profile:     Nonstress Test Interpretation: reactive      Overall Impression:  Reassuring

## 2019-04-24 ENCOUNTER — ROUTINE PRENATAL (OUTPATIENT)
Dept: OBSTETRICS AND GYNECOLOGY | Facility: CLINIC | Age: 29
End: 2019-04-24
Payer: OTHER GOVERNMENT

## 2019-04-24 VITALS
SYSTOLIC BLOOD PRESSURE: 112 MMHG | WEIGHT: 156 LBS | HEART RATE: 80 BPM | DIASTOLIC BLOOD PRESSURE: 78 MMHG | BODY MASS INDEX: 27.63 KG/M2

## 2019-04-24 DIAGNOSIS — O26.893 RH NEGATIVE STATUS DURING PREGNANCY IN THIRD TRIMESTER: ICD-10-CM

## 2019-04-24 DIAGNOSIS — Z34.93 ENCOUNTER FOR SUPERVISION OF NORMAL PREGNANCY IN THIRD TRIMESTER, UNSPECIFIED GRAVIDITY: ICD-10-CM

## 2019-04-24 DIAGNOSIS — Z67.91 RH NEGATIVE STATUS DURING PREGNANCY IN THIRD TRIMESTER: ICD-10-CM

## 2019-04-24 DIAGNOSIS — Z34.03 ENCOUNTER FOR SUPERVISION OF NORMAL FIRST PREGNANCY IN THIRD TRIMESTER: Primary | ICD-10-CM

## 2019-04-24 DIAGNOSIS — O36.5930 POOR FETAL GROWTH AFFECTING MANAGEMENT OF MOTHER IN THIRD TRIMESTER, SINGLE OR UNSPECIFIED FETUS: ICD-10-CM

## 2019-04-24 DIAGNOSIS — Z3A.39 39 WEEKS GESTATION OF PREGNANCY: ICD-10-CM

## 2019-04-24 DIAGNOSIS — Z28.39 RUBELLA NON-IMMUNE STATUS, ANTEPARTUM: ICD-10-CM

## 2019-04-24 DIAGNOSIS — O09.899 RUBELLA NON-IMMUNE STATUS, ANTEPARTUM: ICD-10-CM

## 2019-04-24 PROCEDURE — 99999 PR PBB SHADOW E&M-EST. PATIENT-LVL II: ICD-10-PCS | Mod: PBBFAC,,, | Performed by: OBSTETRICS & GYNECOLOGY

## 2019-04-24 PROCEDURE — 76816 OB US FOLLOW-UP PER FETUS: CPT | Mod: 26,S$PBB,, | Performed by: OBSTETRICS & GYNECOLOGY

## 2019-04-24 PROCEDURE — 99212 OFFICE O/P EST SF 10 MIN: CPT | Mod: PBBFAC | Performed by: OBSTETRICS & GYNECOLOGY

## 2019-04-24 PROCEDURE — 76819 FETAL BIOPHYS PROFIL W/O NST: CPT | Mod: 26,S$PBB,, | Performed by: OBSTETRICS & GYNECOLOGY

## 2019-04-24 PROCEDURE — 76819 PR US, OB, FETAL BIOPHYSICAL, W/O NST: ICD-10-PCS | Mod: 26,S$PBB,, | Performed by: OBSTETRICS & GYNECOLOGY

## 2019-04-24 PROCEDURE — 76816 OB US FOLLOW-UP PER FETUS: CPT | Mod: PBBFAC | Performed by: OBSTETRICS & GYNECOLOGY

## 2019-04-24 PROCEDURE — 0502F SUBSEQUENT PRENATAL CARE: CPT | Mod: ,,, | Performed by: OBSTETRICS & GYNECOLOGY

## 2019-04-24 PROCEDURE — 76819 FETAL BIOPHYS PROFIL W/O NST: CPT | Mod: PBBFAC | Performed by: OBSTETRICS & GYNECOLOGY

## 2019-04-24 PROCEDURE — 0502F PR SUBSEQUENT PRENATAL CARE: ICD-10-PCS | Mod: ,,, | Performed by: OBSTETRICS & GYNECOLOGY

## 2019-04-24 PROCEDURE — 76816 PR  US,PREGNANT UTERUS,F/U,TRANSABD APP: ICD-10-PCS | Mod: 26,S$PBB,, | Performed by: OBSTETRICS & GYNECOLOGY

## 2019-04-24 PROCEDURE — 99999 PR PBB SHADOW E&M-EST. PATIENT-LVL II: CPT | Mod: PBBFAC,,, | Performed by: OBSTETRICS & GYNECOLOGY

## 2019-04-24 RX ORDER — MISOPROSTOL 100 UG/1
600 TABLET ORAL
Status: CANCELLED | OUTPATIENT
Start: 2019-04-24

## 2019-04-24 RX ORDER — SODIUM CHLORIDE, SODIUM LACTATE, POTASSIUM CHLORIDE, CALCIUM CHLORIDE 600; 310; 30; 20 MG/100ML; MG/100ML; MG/100ML; MG/100ML
INJECTION, SOLUTION INTRAVENOUS CONTINUOUS
Status: CANCELLED | OUTPATIENT
Start: 2019-04-24

## 2019-04-24 RX ORDER — SODIUM CHLORIDE 9 MG/ML
INJECTION, SOLUTION INTRAVENOUS
Status: CANCELLED | OUTPATIENT
Start: 2019-04-24

## 2019-04-24 RX ORDER — ONDANSETRON 4 MG/1
8 TABLET, ORALLY DISINTEGRATING ORAL EVERY 8 HOURS PRN
Status: CANCELLED | OUTPATIENT
Start: 2019-04-24

## 2019-04-24 NOTE — PROGRESS NOTES
Patient with no complaints. Denies vaginal bleeding or contractions. Good FM. RTC in 1 week.     Growth scan today: 7%. 6 pounds 1 ounce. Normal MVP. Vertex presentation. BPP 8/8.

## 2019-04-24 NOTE — PROCEDURES
Procedures   Obstetrical ultrasound completed today.  See report in imaging section of [facility].

## 2019-04-25 ENCOUNTER — ANESTHESIA EVENT (OUTPATIENT)
Dept: OBSTETRICS AND GYNECOLOGY | Facility: HOSPITAL | Age: 29
End: 2019-04-25
Payer: OTHER GOVERNMENT

## 2019-04-25 ENCOUNTER — HOSPITAL ENCOUNTER (INPATIENT)
Facility: HOSPITAL | Age: 29
LOS: 2 days | Discharge: HOME | End: 2019-04-27
Attending: OBSTETRICS & GYNECOLOGY | Admitting: OBSTETRICS & GYNECOLOGY
Payer: OTHER GOVERNMENT

## 2019-04-25 DIAGNOSIS — Z34.03 ENCOUNTER FOR SUPERVISION OF NORMAL FIRST PREGNANCY IN THIRD TRIMESTER: ICD-10-CM

## 2019-04-25 DIAGNOSIS — O36.5930 POOR FETAL GROWTH AFFECTING MANAGEMENT OF MOTHER IN THIRD TRIMESTER, SINGLE OR UNSPECIFIED FETUS: Primary | ICD-10-CM

## 2019-04-25 LAB
ABO + RH BLD: NORMAL
ABO + RH BLD: NORMAL
BASOPHILS # BLD AUTO: 0.02 K/UL (ref 0–0.2)
BASOPHILS NFR BLD: 0.2 % (ref 0–1.9)
BLD GP AB SCN CELLS X3 SERPL QL: NORMAL
BLD GP AB SCN CELLS X3 SERPL QL: NORMAL
BLOOD GROUP ANTIBODIES SERPL: NORMAL
DIFFERENTIAL METHOD: ABNORMAL
EOSINOPHIL # BLD AUTO: 0.1 K/UL (ref 0–0.5)
EOSINOPHIL NFR BLD: 1.7 % (ref 0–8)
ERYTHROCYTE [DISTWIDTH] IN BLOOD BY AUTOMATED COUNT: 12.4 % (ref 11.5–14.5)
FETAL CELL SCN BLD QL ROSETTE: NORMAL
HCT VFR BLD AUTO: 35 % (ref 37–48.5)
HGB BLD-MCNC: 11.4 G/DL (ref 12–16)
INJECT RH IG VOL PATIENT: NORMAL ML
LYMPHOCYTES # BLD AUTO: 1.3 K/UL (ref 1–4.8)
LYMPHOCYTES NFR BLD: 16 % (ref 18–48)
MCH RBC QN AUTO: 29.6 PG (ref 27–31)
MCHC RBC AUTO-ENTMCNC: 32.6 G/DL (ref 32–36)
MCV RBC AUTO: 91 FL (ref 82–98)
MONOCYTES # BLD AUTO: 0.7 K/UL (ref 0.3–1)
MONOCYTES NFR BLD: 8.6 % (ref 4–15)
NEUTROPHILS # BLD AUTO: 5.9 K/UL (ref 1.8–7.7)
NEUTROPHILS NFR BLD: 73.5 % (ref 38–73)
PLATELET # BLD AUTO: 197 K/UL (ref 150–350)
PMV BLD AUTO: 11.7 FL (ref 9.2–12.9)
RBC # BLD AUTO: 3.85 M/UL (ref 4–5.4)
WBC # BLD AUTO: 8.06 K/UL (ref 3.9–12.7)

## 2019-04-25 PROCEDURE — 72100003 HC LABOR CARE, EA. ADDL. 8 HRS

## 2019-04-25 PROCEDURE — 59400 PR FULL ROUT OBSTE CARE,VAGINAL DELIV: ICD-10-PCS | Mod: ,,, | Performed by: OBSTETRICS & GYNECOLOGY

## 2019-04-25 PROCEDURE — 88307 TISSUE SPECIMEN TO PATHOLOGY - SURGERY: ICD-10-PCS | Mod: 26,,, | Performed by: PATHOLOGY

## 2019-04-25 PROCEDURE — 25000003 PHARM REV CODE 250: Performed by: NURSE ANESTHETIST, CERTIFIED REGISTERED

## 2019-04-25 PROCEDURE — 59400 OBSTETRICAL CARE: CPT | Mod: ,,, | Performed by: OBSTETRICS & GYNECOLOGY

## 2019-04-25 PROCEDURE — 25000003 PHARM REV CODE 250: Performed by: OBSTETRICS & GYNECOLOGY

## 2019-04-25 PROCEDURE — 62326 NJX INTERLAMINAR LMBR/SAC: CPT | Performed by: NURSE ANESTHETIST, CERTIFIED REGISTERED

## 2019-04-25 PROCEDURE — 86592 SYPHILIS TEST NON-TREP QUAL: CPT

## 2019-04-25 PROCEDURE — 88307 TISSUE EXAM BY PATHOLOGIST: CPT | Performed by: PATHOLOGY

## 2019-04-25 PROCEDURE — 88307 TISSUE EXAM BY PATHOLOGIST: CPT | Mod: 26,,, | Performed by: PATHOLOGY

## 2019-04-25 PROCEDURE — 01967 NEURAXL LBR ANES VAG DLVR: CPT | Mod: QZ,P2 | Performed by: NURSE ANESTHETIST, CERTIFIED REGISTERED

## 2019-04-25 PROCEDURE — 85025 COMPLETE CBC W/AUTO DIFF WBC: CPT

## 2019-04-25 PROCEDURE — 86870 RBC ANTIBODY IDENTIFICATION: CPT

## 2019-04-25 PROCEDURE — 51702 INSERT TEMP BLADDER CATH: CPT

## 2019-04-25 PROCEDURE — 72100002 HC LABOR CARE, 1ST 8 HOURS

## 2019-04-25 PROCEDURE — 63600175 PHARM REV CODE 636 W HCPCS: Performed by: OBSTETRICS & GYNECOLOGY

## 2019-04-25 PROCEDURE — 85461 HEMOGLOBIN FETAL: CPT

## 2019-04-25 PROCEDURE — 36415 COLL VENOUS BLD VENIPUNCTURE: CPT

## 2019-04-25 PROCEDURE — 63600519 RHOGAM PHARM REV CODE 636 ALT 250 W HCPCS: Performed by: OBSTETRICS & GYNECOLOGY

## 2019-04-25 PROCEDURE — 11000001 HC ACUTE MED/SURG PRIVATE ROOM

## 2019-04-25 PROCEDURE — 72200005 HC VAGINAL DELIVERY LEVEL II

## 2019-04-25 PROCEDURE — 86850 RBC ANTIBODY SCREEN: CPT | Mod: 91

## 2019-04-25 PROCEDURE — 86850 RBC ANTIBODY SCREEN: CPT

## 2019-04-25 RX ORDER — ACETAMINOPHEN 325 MG/1
650 TABLET ORAL EVERY 6 HOURS PRN
Status: DISCONTINUED | OUTPATIENT
Start: 2019-04-25 | End: 2019-04-27 | Stop reason: HOSPADM

## 2019-04-25 RX ORDER — SODIUM CHLORIDE, SODIUM LACTATE, POTASSIUM CHLORIDE, CALCIUM CHLORIDE 600; 310; 30; 20 MG/100ML; MG/100ML; MG/100ML; MG/100ML
INJECTION, SOLUTION INTRAVENOUS CONTINUOUS
Status: DISCONTINUED | OUTPATIENT
Start: 2019-04-25 | End: 2019-04-25

## 2019-04-25 RX ORDER — SODIUM CHLORIDE 9 MG/ML
INJECTION, SOLUTION INTRAVENOUS
Status: DISCONTINUED | OUTPATIENT
Start: 2019-04-25 | End: 2019-04-25

## 2019-04-25 RX ORDER — IBUPROFEN 600 MG/1
600 TABLET ORAL EVERY 6 HOURS PRN
Status: DISCONTINUED | OUTPATIENT
Start: 2019-04-25 | End: 2019-04-27 | Stop reason: HOSPADM

## 2019-04-25 RX ORDER — OXYTOCIN/RINGER'S LACTATE 20/1000 ML
41.7 PLASTIC BAG, INJECTION (ML) INTRAVENOUS CONTINUOUS
Status: DISCONTINUED | OUTPATIENT
Start: 2019-04-25 | End: 2019-04-25

## 2019-04-25 RX ORDER — PRENATAL WITH FERROUS FUM AND FOLIC ACID 3080; 920; 120; 400; 22; 1.84; 3; 20; 10; 1; 12; 200; 27; 25; 2 [IU]/1; [IU]/1; MG/1; [IU]/1; MG/1; MG/1; MG/1; MG/1; MG/1; MG/1; UG/1; MG/1; MG/1; MG/1; MG/1
1 TABLET ORAL DAILY
Status: DISCONTINUED | OUTPATIENT
Start: 2019-04-25 | End: 2019-04-27 | Stop reason: HOSPADM

## 2019-04-25 RX ORDER — ONDANSETRON 4 MG/1
8 TABLET, ORALLY DISINTEGRATING ORAL EVERY 8 HOURS PRN
Status: DISCONTINUED | OUTPATIENT
Start: 2019-04-25 | End: 2019-04-25

## 2019-04-25 RX ORDER — DIPHENHYDRAMINE HCL 25 MG
25 CAPSULE ORAL EVERY 4 HOURS PRN
Status: DISCONTINUED | OUTPATIENT
Start: 2019-04-25 | End: 2019-04-27 | Stop reason: HOSPADM

## 2019-04-25 RX ORDER — HYDROCODONE BITARTRATE AND ACETAMINOPHEN 5; 325 MG/1; MG/1
1 TABLET ORAL EVERY 4 HOURS PRN
Status: DISCONTINUED | OUTPATIENT
Start: 2019-04-25 | End: 2019-04-27 | Stop reason: HOSPADM

## 2019-04-25 RX ORDER — DOCUSATE SODIUM 100 MG/1
200 CAPSULE, LIQUID FILLED ORAL 2 TIMES DAILY PRN
Status: DISCONTINUED | OUTPATIENT
Start: 2019-04-25 | End: 2019-04-27 | Stop reason: HOSPADM

## 2019-04-25 RX ORDER — ONDANSETRON 8 MG/1
8 TABLET, ORALLY DISINTEGRATING ORAL EVERY 8 HOURS PRN
Status: DISCONTINUED | OUTPATIENT
Start: 2019-04-25 | End: 2019-04-27 | Stop reason: HOSPADM

## 2019-04-25 RX ORDER — MISOPROSTOL 200 UG/1
600 TABLET ORAL
Status: DISCONTINUED | OUTPATIENT
Start: 2019-04-25 | End: 2019-04-25

## 2019-04-25 RX ORDER — OXYTOCIN/RINGER'S LACTATE 20/1000 ML
333 PLASTIC BAG, INJECTION (ML) INTRAVENOUS CONTINUOUS
Status: DISCONTINUED | OUTPATIENT
Start: 2019-04-25 | End: 2019-04-25

## 2019-04-25 RX ORDER — DIPHENHYDRAMINE HYDROCHLORIDE 50 MG/ML
25 INJECTION INTRAMUSCULAR; INTRAVENOUS EVERY 4 HOURS PRN
Status: DISCONTINUED | OUTPATIENT
Start: 2019-04-25 | End: 2019-04-27 | Stop reason: HOSPADM

## 2019-04-25 RX ORDER — OXYTOCIN/RINGER'S LACTATE 20/1000 ML
2 PLASTIC BAG, INJECTION (ML) INTRAVENOUS CONTINUOUS
Status: DISCONTINUED | OUTPATIENT
Start: 2019-04-25 | End: 2019-04-25

## 2019-04-25 RX ADMIN — DOCUSATE SODIUM 200 MG: 100 CAPSULE, LIQUID FILLED ORAL at 11:04

## 2019-04-25 RX ADMIN — PRENATAL VIT W/ FE FUMARATE-FA TAB 27-0.8 MG 1 TABLET: 27-0.8 TAB at 04:04

## 2019-04-25 RX ADMIN — SODIUM CHLORIDE, SODIUM LACTATE, POTASSIUM CHLORIDE, AND CALCIUM CHLORIDE: .6; .31; .03; .02 INJECTION, SOLUTION INTRAVENOUS at 07:04

## 2019-04-25 RX ADMIN — Medication 2 MILLI-UNITS/MIN: at 05:04

## 2019-04-25 RX ADMIN — DOCUSATE SODIUM 200 MG: 100 CAPSULE, LIQUID FILLED ORAL at 04:04

## 2019-04-25 RX ADMIN — SODIUM CHLORIDE, SODIUM LACTATE, POTASSIUM CHLORIDE, AND CALCIUM CHLORIDE: .6; .31; .03; .02 INJECTION, SOLUTION INTRAVENOUS at 05:04

## 2019-04-25 RX ADMIN — HUMAN RHO(D) IMMUNE GLOBULIN 300 MCG: 300 INJECTION, SOLUTION INTRAMUSCULAR at 07:04

## 2019-04-25 RX ADMIN — Medication 10 ML/HR: at 09:04

## 2019-04-25 RX ADMIN — IBUPROFEN 600 MG: 600 TABLET ORAL at 11:04

## 2019-04-25 NOTE — ANESTHESIA PROCEDURE NOTES
Epidural    Patient location during procedure: OB   Reason for block: primary anesthetic   Diagnosis: pregnancy   Start time: 4/25/2019 9:38 AM  Timeout: 4/25/2019 9:37 AM  End time: 4/25/2019 9:48 AM  Surgery related to: pregnancy  Staffing  Resident/CRNA: Garry Guillory CRNA  Performed: resident/CRNA   Preanesthetic Checklist  Completed: patient identified, site marked, surgical consent, pre-op evaluation, timeout performed, IV checked, risks and benefits discussed, monitors and equipment checked, anesthesia consent given, hand hygiene performed and patient being monitored  Preparation  Patient position: sitting  Prep: ChloraPrep  Patient monitoring: Pulse Ox and Blood Pressure  Epidural  Skin Anesthetic: lidocaine 1%  Skin Wheal: 3 mL  Administration type: single shot  Approach: midline  Interspace: L2-3    Injection technique: ENDER saline  Needle and Epidural Catheter  Needle type: Tuohy   Needle gauge: 18  Needle length: 3.5 inches  Needle insertion depth: 6 cm  Catheter type: multi-orifice  Catheter size: 20 G  Catheter at skin depth: 12 cm  Test dose: 3 mL of lidocaine 1.5% with Epi 1-to-200,000  Additional Documentation: incremental injection, negative aspiration for heme and CSF, no paresthesia on injection, no signs/symptoms of IV or SA injection, no significant complaints from patient and no significant pain on injection  Needle localization: anatomical landmarks  Medications:  Volume per aspiration: 2.5 mL  Assessment  Upper dermatomal levels - Left: T10  Right: T10   Dermatomal levels determined by pinch or prick  Ease of block: easy  Patient's tolerance of the procedure: comfortable throughout block and no complaints

## 2019-04-25 NOTE — H&P
History and Physical  Obstetrics      SUBJECTIVE:     Teodora Weiner is a 28 y.o.  female  at 39w1d weeks gestation with an Estimated Date of Delivery: 19 who is admitted for induction of labor secondary to IUGR with fetal weight at 7%. She denies Decreased Fetal Movement, Leakage of Fluid, Vaginal Bleeding and Contractions. Fetal Movement: normal.    She has been followed prenatally with the following prenatal complications:   Patient Active Problem List   Diagnosis    Rh negative status during pregnancy    Rubella non-immune status, antepartum    Encounter for supervision of normal pregnancy in third trimester    Poor fetal growth affecting management of mother in third trimester    Encounter for supervision of normal first pregnancy in third trimester         HISTORY:     Prior to Admission medications    Medication Sig Start Date End Date Taking? Authorizing Provider   PNV,calcium 72-iron-folic acid (PRENATAL VITAMIN PLUS LOW IRON) 27 mg iron- 1 mg Tab Take 1 tablet (1 each total) by mouth once daily. 18 Yes Fariba Hudson MD     Outpatient Medications Marked as Taking for the 19 encounter (Hospital Encounter)   Medication Sig Dispense Refill    PNV,calcium 72-iron-folic acid (PRENATAL VITAMIN PLUS LOW IRON) 27 mg iron- 1 mg Tab Take 1 tablet (1 each total) by mouth once daily. 30 tablet 11      History reviewed. No pertinent past medical history.  Past Surgical History:   Procedure Laterality Date    CYST REMOVAL       Family History   Problem Relation Age of Onset    Asthma Mother     Hypertension Father     Heart disease Father     Heart disease Brother     Breast cancer Neg Hx     Colon cancer Neg Hx     Ovarian cancer Neg Hx      Social History     Tobacco Use    Smoking status: Never Smoker    Smokeless tobacco: Never Used   Substance Use Topics    Alcohol use: No     Frequency: Never    Drug use: No     OB History    Para Term  AB  Living   1             SAB TAB Ectopic Multiple Live Births                  # Outcome Date GA Lbr Flash/2nd Weight Sex Delivery Anes PTL Lv   1 Current                  Allergy:   Review of patient's allergies indicates:  No Known Allergies       ROS:   CONSTITUTIONAL: Negative for fever, chills, diaphoresis, weakness, fatigue, weight loss, weight gain  ENT: negative for sore throat, nasal congestion, nasal discharge, epistaxis, tinnitus, hearing loss  EYES: negative for blurry vision, decreased vision, loss of vision, eye pain, diplopia, photophobia, discharge  SKIN: Negative for rash, itching, hives  RESPIRATORY: negative for cough, hemoptysis, shortness of breath, pleuritic chest pain, wheezing  CARDIOVASCULAR: negative for chest pain, dyspnea on exertion, orthopnea, paroxysmal nocturnal dyspnea, edema, palpitations  BREAST: negative for breast  tenderness, breast mass, nipple discharge, or skin changes  GASTROINTESTINAL: negative for abdominal pain, flank pain, nausea, vomiting, diarrhea, constipation, black stool, blood in stool  GENITOURINARY: negative for abnormal vaginal bleeding, amenorrhea, decreased libido, dysuria, genital sores, hematuria, incontinence, menorrhagia, pelvic pain, urinary frequency, vaginal discharge  HEMATOLOGIC/LYMPHATIC: negative for swollen lymph nodes, bleeding, bruising  MUSCULOSKELETAL: negative for back pain, joint pain, joint stiffness, joint swelling, muscle pain, muscle weakness  NEUROLOGICAL: negative for dizzy/vertigo, headache, focal weakness, numbness/tingling, speech problems, loss of consciousness, confusion, memory loss  BEHAVORIAL/PSYCH: negative for anxiety, depression, psychiatric illness  ENDOCRINE: negative for polydipsia/polyuria, palpitations, skin changes, temperature intolerance, unexpected weight changes  ALLERGIC/IMMUNOLOGIC: negative for urticaria, hay fever, angioedema      OBJECTIVE:     Initial Vitals [04/25/19 0522]   BP Pulse Resp Temp SpO2   132/63 74 16  98.3 °F (36.8 °C) 98 %      MAP       --             Physical Exam:  General:  alert,normal appearing gravid female   Skin:  Skin color, texture, turgor normal. No rashes or lesions   HEENT:  conjunctivae/corneas clear. JACQUIE   Lungs:  clear to auscultation bilaterally   Heart:  regular rate and rhythm, S1, S2 normal, no murmur, click, rub or gallop   Breasts:  no discharge, erythema, or tenderness   Abdomen:  soft, non-tender; bowel sounds normal   Uterine Size:  consistent with dates   Presentations:  cephalic   FHT:  140 BPM   Pelvis: External genitalia: normal external genitalia without lesions  Vaginal: without lesions or discharge   Cervix:     Dilation: 3 cm    Effacement: 80 %    Station:  -2    Consistency: medium    Position: middle       OB Lab History:     Group & Rh   Date Value Ref Range Status   04/25/2019 AB NEG  Final     Indirect Martha   Date Value Ref Range Status   04/25/2019 POS  Final     Lab Results   Component Value Date    WBC 8.06 04/25/2019    HGB 11.4 (L) 04/25/2019    HCT 35.0 (L) 04/25/2019    MCV 91 04/25/2019     04/25/2019     No results found for: TSH  Lab Results   Component Value Date    RUBELLAIGGAN 9.3 (A) 09/21/2018     Lab Results   Component Value Date    RPR Non-reactive 09/21/2018     HIV 1/2 Ag/Ab   Date Value Ref Range Status   09/21/2018 Negative Negative Final     Hepatitis B Surface Ag   Date Value Ref Range Status   09/21/2018 Negative  Final     Results for orders placed or performed in visit on 02/01/19   OB Glucose Screen   Result Value Ref Range    OB Glucose Screen 124 70 - 140 mg/dL     Results for orders placed or performed in visit on 04/01/19   Strep B Screen, Vaginal / Rectal   Result Value Ref Range    Strep B Culture No Group B Streptococcus isolated      No results found for this or any previous visit.  Results for orders placed or performed in visit on 09/21/18   C. trachomatis/N. gonorrhoeae by AMP DNA   Result Value Ref Range    Chlamydia,  Amplified DNA Not Detected Not Detected    N gonorrhoeae, amplified DNA Not Detected Not Detected       ASSESSMENT/PLAN:     IUP 39w1d gestation  Patient Active Problem List   Diagnosis    Rh negative status during pregnancy    Rubella non-immune status, antepartum    Encounter for supervision of normal pregnancy in third trimester    Poor fetal growth affecting management of mother in third trimester    Encounter for supervision of normal first pregnancy in third trimester         PLAN:   Pitocin IOL  GBS negative; no prophylaxis needed   Consents  Admit labs      Patient cleared for Anesthesia:  Epidural    Anesthesia/Surgery risks, benefits, and alternative options discussed and understood by patient/family.

## 2019-04-25 NOTE — ANESTHESIA PREPROCEDURE EVALUATION
04/25/2019  Teodora Weiner is a 28 y.o., female.    Anesthesia Evaluation    I have reviewed the Patient Summary Reports.    I have reviewed the Nursing Notes.   I have reviewed the Medications.     Review of Systems  Anesthesia Hx:  No problems with previous Anesthesia    Social:  Non-Smoker, No Alcohol Use    Hematology/Oncology:  Hematology Normal   Oncology Normal     EENT/Dental:EENT/Dental Normal   Cardiovascular:  Cardiovascular Normal Exercise tolerance: good     Pulmonary:  Pulmonary Normal    Renal/:  Renal/ Normal     Hepatic/GI:  Hepatic/GI Normal    Musculoskeletal:  Musculoskeletal Normal    Neurological:  Neurology Normal    Endocrine:  Endocrine Normal    Dermatological:  Skin Normal    Psych:  Psychiatric Normal           Physical Exam  General:  Well nourished    Airway/Jaw/Neck:  Airway Findings: Mouth Opening: Normal Tongue: Normal  General Airway Assessment: Adult  Mallampati: II  TM Distance: Normal, at least 6 cm      Dental:  Dental Findings: In tact             Anesthesia Plan  Type of Anesthesia, risks & benefits discussed:  Anesthesia Type:  epidural  Patient's Preference:   Intra-op Monitoring Plan: standard ASA monitors  Intra-op Monitoring Plan Comments:   Post Op Pain Control Plan: per primary service following discharge from PACU and multimodal analgesia  Post Op Pain Control Plan Comments:   Induction:   IV  Beta Blocker:  Patient is not currently on a Beta-Blocker (No further documentation required).       Informed Consent: Patient understands risks and agrees with Anesthesia plan.  Questions answered. Anesthesia consent signed with patient.  ASA Score: 2     Day of Surgery Review of History & Physical: I have interviewed and examined the patient. I have reviewed the patient's H&P dated: 4/25/19. There are no significant changes.  H&P update referred to the  surgeon.         Ready For Surgery From Anesthesia Perspective.

## 2019-04-25 NOTE — ANESTHESIA POSTPROCEDURE EVALUATION
Anesthesia Post Evaluation    Patient: Teodora Weiner    Procedure(s) Performed: * No procedures listed *    Final Anesthesia Type: epidural  Patient location during evaluation: PACU  Patient participation: Yes- Able to Participate  Level of consciousness: awake and alert, oriented and awake  Post-procedure vital signs: reviewed and stable  Pain management: adequate  Airway patency: patent  PONV status at discharge: No PONV  Anesthetic complications: no      Cardiovascular status: blood pressure returned to baseline, hemodynamically stable and stable  Respiratory status: unassisted, spontaneous ventilation and room air  Hydration status: euvolemic  Follow-up not needed.          Vitals Value Taken Time   /65 4/25/2019  2:45 PM   Temp 35.7 °C (96.2 °F) 4/25/2019  1:12 PM   Pulse 70 4/25/2019  2:49 PM   Resp 18 4/25/2019  1:12 PM   SpO2 98 % 4/25/2019  2:49 PM   Vitals shown include unvalidated device data.      No case tracking events are documented in the log.      Pain/Jeb Score: No data recorded

## 2019-04-26 LAB — RPR SER QL: NORMAL

## 2019-04-26 PROCEDURE — 11000001 HC ACUTE MED/SURG PRIVATE ROOM

## 2019-04-26 PROCEDURE — 25000003 PHARM REV CODE 250: Performed by: OBSTETRICS & GYNECOLOGY

## 2019-04-26 RX ADMIN — IBUPROFEN 600 MG: 600 TABLET ORAL at 08:04

## 2019-04-26 RX ADMIN — DOCUSATE SODIUM 200 MG: 100 CAPSULE, LIQUID FILLED ORAL at 12:04

## 2019-04-26 RX ADMIN — IBUPROFEN 600 MG: 600 TABLET ORAL at 12:04

## 2019-04-26 RX ADMIN — DOCUSATE SODIUM 200 MG: 100 CAPSULE, LIQUID FILLED ORAL at 08:04

## 2019-04-26 RX ADMIN — PRENATAL VIT W/ FE FUMARATE-FA TAB 27-0.8 MG 1 TABLET: 27-0.8 TAB at 09:04

## 2019-04-26 NOTE — LACTATION NOTE
This note was copied from a baby's chart.     04/26/19 8179   Maternal Infant Feeding   Infant Positioning cross-cradle   Signs of Milk Transfer audible swallow;infant jaw motion present     Mom calls for feeding cues. Assisted with feeding and observed Latch. Score of 7 for flat nipples and minimal help needed. Mom with nipple tenderness but only mild redness noted. Assessed feeding. Reviewed education on latch, positioning,milk transfer and importance of baby led feeding on cue (8 or more times daily) and use of hand expression. LATCH score complete. Reviewed the risk associated with use of pacifiers and reasons to avoid artificial nipples. Encouraged mother to use Breastfeeding Guide to track feedings and output. Questions/ Concerns answered. Mother verbalizes understanding.   Will check on again tomorrow.

## 2019-04-26 NOTE — LACTATION NOTE
This note was copied from a baby's chart.     04/26/19 0940   Maternal Information   Date of Referral 04/26/19   Person Making Referral nurse   Maternal Reason for Referral nipple anomaly  (shells provided for flat nipples)   Maternal Assessment   Breast Size Issue none   Breast Shape Bilateral:;round   Breast Density Bilateral:;soft   Areola Bilateral:;elastic   Nipples Bilateral:;everted;graspable   Maternal Infant Feeding   Maternal Preparation hand hygiene   Maternal Emotional State relaxed;assist needed   Pain with Feeding other (see comments)  (mom denies)   Latch Assistance   (offered and encouraged today)   Equipment Type   Breast Pump Type double electric, personal;other (see comments)  (confirmed mother has a pump @ home)   Lactation Referrals   Lactation Referrals outpatient lactation program;support group   Outpatient Lactation Program Lactation Follow-up Date/Time clinic discussed and scheduled   Support Group Lactation Follow-up Date/Time 2019 flyer     Routine visit completed. Mom in shells for flat nipples. Education provided on latch, positioning,milk transfer and importance of baby led feeding on cue (8 or more times daily) and use of hand expression. Reviewed the risk associated with use of pacifiers and reasons to avoid artificial nipples. Encouraged mother to use Breastfeeding Guide to track feedings and output. Questions/ Concerns answered. Mother verbalizes understanding. Used Breastfeeding Guide and reviewed first alert form, importance/ benefits of exclusive breastfeeding for 6 months, proper handling and storage of breast milk, and all resources available after leaving the hospital. Questions/ Concerns answered. Mother verbalized understanding.   Mom to call for feeding cues for latch obs today.

## 2019-04-27 VITALS
HEART RATE: 62 BPM | SYSTOLIC BLOOD PRESSURE: 116 MMHG | BODY MASS INDEX: 28.34 KG/M2 | RESPIRATION RATE: 18 BRPM | HEIGHT: 62 IN | WEIGHT: 154 LBS | OXYGEN SATURATION: 95 % | TEMPERATURE: 98 F | DIASTOLIC BLOOD PRESSURE: 72 MMHG

## 2019-04-27 PROCEDURE — 0503F POSTPARTUM CARE VISIT: CPT | Mod: ,,, | Performed by: OBSTETRICS & GYNECOLOGY

## 2019-04-27 PROCEDURE — 63600175 PHARM REV CODE 636 W HCPCS: Performed by: OBSTETRICS & GYNECOLOGY

## 2019-04-27 PROCEDURE — 0503F PR POSTPARTUM CARE VISIT: ICD-10-PCS | Mod: ,,, | Performed by: OBSTETRICS & GYNECOLOGY

## 2019-04-27 PROCEDURE — 90471 IMMUNIZATION ADMIN: CPT | Performed by: OBSTETRICS & GYNECOLOGY

## 2019-04-27 PROCEDURE — 90710 MMRV VACCINE SC: CPT | Performed by: OBSTETRICS & GYNECOLOGY

## 2019-04-27 PROCEDURE — 25000003 PHARM REV CODE 250: Performed by: OBSTETRICS & GYNECOLOGY

## 2019-04-27 RX ADMIN — MEASLES, MUMPS, AND RUBELLA VIRUS VACCINE LIVE 0.5 ML: 1000; 12500; 1000 INJECTION, POWDER, LYOPHILIZED, FOR SUSPENSION SUBCUTANEOUS at 11:04

## 2019-04-27 RX ADMIN — PRENATAL VIT W/ FE FUMARATE-FA TAB 27-0.8 MG 1 TABLET: 27-0.8 TAB at 08:04

## 2019-04-27 NOTE — SUBJECTIVE & OBJECTIVE
Hospital course: HD#1 Admitted for IOL; uncomplicated   HD#2/PPD#1 Routine PP care    Interval History:     She is doing well this morning. She is tolerating a regular diet without nausea or vomiting. She is voiding spontaneously. She is ambulating. She has passed flatus, and has not a BM. Vaginal bleeding is mild. She denies fever or chills. Abdominal pain is mild and controlled with oral medications. She is breastfeeding. She desires circumcision for her male baby: not applicable.    Objective:     Vital Signs (Most Recent):  Temp: 97.8 °F (36.6 °C) (19)  Pulse: 73 (19)  Resp: 17 (19)  BP: (!) 116/57 (19)  SpO2: 96 % (19) Vital Signs (24h Range):  Temp:  [96.8 °F (36 °C)-97.9 °F (36.6 °C)] 97.8 °F (36.6 °C)  Pulse:  [68-76] 73  Resp:  [17-18] 17  SpO2:  [96 %-97 %] 96 %  BP: (115-121)/(57-61) 116/57     Weight: 69.9 kg (154 lb)  Body mass index is 28.17 kg/m².    No intake or output data in the 24 hours ending 19    Significant Labs:  Lab Results   Component Value Date    GROUPTRH AB NEG 2019    HEPBSAG Negative 2018    STREPBCULT No Group B Streptococcus isolated 2019     Recent Labs   Lab 19  0544   HGB 11.4*   HCT 35.0*       I have personallly reviewed all pertinent lab results from the last 24 hours.    Physical Exam    Chest: Clear to auscultation   Breasts: Soft. Nontender. breast feeding. Cardiovascular: Regular rate and Rhythm. No tachycardia   Abd: normal size, well involuted, firm, non-tender   Vag: healing well, no significant drainage, no dehiscence, no significant erythema. Lochia: minimal   Ext: No calf tenderness; Edema: absent

## 2019-04-27 NOTE — L&D DELIVERY NOTE
[facility]  Vaginal Delivery   Operative Note    SUMMARY     Normal spontaneous vaginal delivery of live infant, was placed on mothers abdomen for skin to skin and bulb suctioning performed.  Infant delivered position OA over intact perineum.  Nuchal cord: No.    Spontaneous delivery of placenta and IV pitocin given noting good uterine tone.  No lacerations noted.  Patient tolerated delivery well. Sponge needle and lap counted correctly x2.    Indications: Encounter for supervision of normal first pregnancy in third trimester  Pregnancy complicated by:   Patient Active Problem List   Diagnosis    Rh negative status during pregnancy    Rubella non-immune status, antepartum    Encounter for supervision of normal pregnancy in third trimester    Poor fetal growth affecting management of mother in third trimester    Encounter for supervision of normal first pregnancy in third trimester     Admitting GA: 39w1d    Delivery Information for  Girl Teodora Weiner    Birth information:  YOB: 2017   Time of birth: 2:08 PM   Sex: female   Head Delivery Date/Time: 4/25/2019  2:08 PM   Delivery type: Vaginal, Spontaneous   Gestational Age: 39w1d    Delivery Providers    Delivering clinician:  April Brown MD   Provider Role    Suly Anglin RN Registered Nurse    Tracy Lillyhill Surgical Tech    Marybel Pelayo RN Registered Nurse            Measurements    Weight:  2807 g  Length:  50.2 cm  Head circumference:  33 cm  Chest circumference:  29.2 cm  Abdominal girth:  26 cm         Apgars    Living status:  Living  Apgars:   1 min.:   5 min.:   10 min.:   15 min.:   20 min.:     Skin color:   0  1       Heart rate:   2  2       Reflex irritability:   2  2       Muscle tone:   2  2       Respiratory effort:   2  2       Total:   8  9       Apgars assigned by:  TOBY PELAYO RN         Operative Delivery    Forceps attempted?:  No  Vacuum extractor attempted?:  No          Shoulder Dystocia    Shoulder dystocia present?:  No           Presentation    Presentation:  Vertex  Position:  Occiput Anterior           Interventions/Resuscitation    Method:  None       Cord    Vessels:  3 vessels  Complications:  None  Delayed Cord Clamping?:  Yes  Cord Clamped Date/Time:  2019  2:12 PM  Cord Blood Disposition:  Lab  Gases Sent?:  No  Stem Cell Collection (by MD):  No       Placenta    Placenta delivery date/time:  2019 1415  Placenta removal:  Spontaneous  Placenta appearance:  Intact  Placenta disposition:  discarded           Labor Events:       labor: No     Labor Onset Date/Time:         Dilation Complete Date/Time: 2019 13:35     Start Pushing Date/Time: 2019 13:45     Rupture Date/Time:              Rupture type: artificial rupture of membranes         Fluid Amount: small      Fluid Color:        Fluid Odor:        Membrane Status (PeriCalm): ARM (Artificial Rupture)      Rupture Date/Time (PeriCalm): 2019 09:05:00      Fluid Amount (PeriCalm): Small      Fluid Color (PeriCalm): Clear       steroids: None     Antibiotics given for GBS: No     Induction: oxytocin     Indications for induction:        Augmentation:       Indications for augmentation:       Labor complications: None     Additional complications: Iugr (Intrauterine Growth Restriction) Affecting Care Of Mother, Third Trimester, Fetus 1        Cervical ripening:                     Delivery:      Episiotomy: None     Indication for Episiotomy:       Perineal Lacerations: 1st Repaired:      Periurethral Laceration:   Repaired:     Labial Laceration:   Repaired:     Sulcus Laceration:   Repaired:     Vaginal Laceration:   Repaired:     Cervical Laceration:   Repaired:     Repair suture:       Repair # of packets: 1     Last Value - EBL - Nursing (mL): 0     Sum - EBL - Nursing (mL): 0     Last Value - EBL - Anesthesia (mL):      Calculated QBL (mL): 106      Vaginal Sweep  Performed: No     Surgicount Correct: Yes       Other providers:       Anesthesia    Method:  Epidural          Details (if applicable):  Trial of Labor      Categorization:      Priority:     Indications for :     Incision Type:       Additional  information:  Forceps:    Vacuum:    Breech:    Observed anomalies    Other (Comments):

## 2019-04-27 NOTE — DISCHARGE SUMMARY
[facility]  Obstetrics  Discharge Summary      Patient Name: Teodora Weiner  MRN: [id number]  Admission Date: 2019  Hospital Length of Stay: 2 days  Discharge Date and Time:  2019 9:47 AM  Attending Physician: April Brown MD   Discharging Provider: Flakito Blackwell MD  Primary Care Provider: Fariba Hudson MD    HPI: Teodora Weiner is a 28 y.o.  female  at 39w1d weeks gestation with an Estimated Date of Delivery: 19 who is admitted for induction of labor secondary to IUGR with fetal weight at 7%. She denies Decreased Fetal Movement, Leakage of Fluid, Vaginal Bleeding and Contractions. Fetal Movement: normal.        * No surgery found *     Hospital Course:   HD#1 Admitted for IOL; uncomplicated   HD#2/PPD#1 Routine PP care   PPD#2 routine pp care. Meeting all discharge criteria.    NAD AA&Ox3   Abdomen soft, NT   Fundus firm, below umbilicus   Lochia: minimal   Ext: No calf tenderness            Final Active Diagnoses:    Diagnosis Date Noted POA    PRINCIPAL PROBLEM:   (spontaneous vaginal delivery) [O80] 2019 Not Applicable    Encounter for supervision of normal first pregnancy in third trimester [Z34.03] 2019 Not Applicable    Poor fetal growth affecting management of mother in third trimester [O36.5930] 2019 Yes    Rubella non-immune status, antepartum [O99.89, Z28.3] 2018 Not Applicable    Rh negative status during pregnancy [O09.899, Z67.91] 2018 Not Applicable      Problems Resolved During this Admission:        Labs: CBC No results for input(s): WBC, HGB, HCT, PLT in the last 48 hours.    Feeding Method: breast    Immunizations     Date Immunization Status Dose Route/Site Given by    19 Rho (D) Immune Globulin - IM Given 300 mcg Intramuscular/Right upper quad. gluteus Gabriella Jovel, RN    19 1527 MMR Incomplete 0.5 mL Subcutaneous/Left deltoid     19 0848 MMR  Incomplete 0.5 mL Subcutaneous/Left deltoid           Delivery:    Episiotomy: None   Lacerations: 1st   Repair suture:     Repair # of packets: 1   Blood loss (ml): 0     Birth information:  YOB: 2017   Time of birth: 2:08 PM   Sex: female   Delivery type: Vaginal, Spontaneous   Gestational Age: 39w1d    Delivery Clinician:      Other providers:       Additional  information:  Forceps:    Vacuum:    Breech:    Observed anomalies      Living?:           APGARS  One minute Five minutes Ten minutes   Skin color:         Heart rate:         Grimace:         Muscle tone:         Breathing:         Totals: 8  9        Placenta: Delivered:       appearance    Pending Diagnostic Studies:     None          Discharged Condition: good    Disposition: Home or Self Care    Follow Up:  Follow-up Information     Felicita Reina CNM In 2 weeks.    Specialty:  Obstetrics and Gynecology  Why:  postpartum follow up  Contact information:  [address] [address]  [place] [address]  [phone]             April Brown MD In 6 weeks.    Specialty:  Obstetrics and Gynecology  Why:  postpartum exam  Contact information:  [address] [facility]   [facility] [id number] [phone]                 Patient Instructions:      Diet Adult Regular     Call MD for:  temperature >100.4     Call MD for:  persistent nausea and vomiting or diarrhea     Call MD for:  severe uncontrolled pain     Call MD for:  redness, tenderness, or signs of infection (pain, swelling, redness, odor or green/yellow discharge around incision site)     Call MD for:  difficulty breathing or increased cough     Call MD for:  severe persistent headache     Call MD for:  worsening rash     Call MD for:  persistent dizziness, light-headedness, or visual disturbances     Call MD for:  increased confusion or weakness     Call MD for:   Order Comments: Obstetric patients: Call for decreased fetal movement, regular uterine contractions, leakage of fluid, vaginal  bleeding or any other concerns  Gynecology patients: Call for incisional drainage, redness, or tenderness, abnormal vaginal bleeding or discharge or any other concerns      No dressing needed     Other restrictions (specify):   Order Comments: Pelvic rest x 6 weeks     No driving until:     Order Specific Question Answer Comments   Date: 5/11/2019      Medications:  Current Discharge Medication List      CONTINUE these medications which have NOT CHANGED    Details   PNV,calcium 72-iron-folic acid (PRENATAL VITAMIN PLUS LOW IRON) 27 mg iron- 1 mg Tab Take 1 tablet (1 each total) by mouth once daily.  Qty: 30 tablet, Refills: 11             Flakito Blackwell MD  Obstetrics  [place] [facility]

## 2019-04-27 NOTE — DISCHARGE INSTRUCTIONS
Postpartum Discharge Instructions:  · No heavy lifting, straining, frequent rest periods  · Pelvic rest--no douching, tampons, or intercourse until released by MD  · Talk to your doctor about birth control--remember breastfeeding is not a method of birth control  · Notify MD if bleeding becomes heavier than usual and if large clots, painful cramping,or foul odor develops.   Vaginal discharge will lighten and decrease in amount gradually.    · Cleanse perineal area from front to back after urination or having a bowel movement.  · Tub soak or portable sitz bath at home.  Apply clean pad with Epifoam and Tucks to perineal area.  · Episiotomy stitches will dissolve within 2-3 weeks  · Remember to keep your breast clean and dry to prevent any cracking  · Notify MD if breast become reddened,swollen, nipples bleed or crack, or fever greater than 100.4  · Notify MD of pain, swelling,  Redness, or heat developing in back of leg especially when foot is flexed toward body  · Notify MD of pain not relieved by pain medication.  · Call MD or go to ER for any concerns      After a Vaginal Birth  After having a baby, your body may be very tired. It can take time to recover from a vaginal delivery. You may stay in the hospital or birth center from 1 to 4 days. In some cases, you may be able to go home the same day.    Right after the delivery  Your temperature and blood pressure will be taken until they are stable. A nurse or other healthcare provider will observe you as you rest. You may have afterbirth pains. These are cramps caused by the uterus shrinking. Sanitary pads are used to soak up the discharge of the uterine lining. To make sure that you arent bleeding too much, the pad will be checked. And the firmness of your uterus will be checked. To do this, a nurse will gently push down on your stomach. If you had anesthesia, youll be watched closely until you can feel and move your toes. If you have perineal pain (pain between  the vagina and anus), an ice pack can help.     care  While still in the hospital or birth center, youll learn how to hold and feed your baby. You will also be given instructions on how to care for your baby. This includes bathing and feeding.     Preparing to go home  You may be anxious to go home as soon as possible. Before you and your baby go home, a healthcare provider will check to be sure you are healthy enough to take care of your baby and yourself. Youre ready to go home when:  · You can walk to the bathroom and use the bathroom without help.  · You can eat solid food and swallow pills (if needed).  · You have no sign of infection or other health problems, including fever.   · You have adequate pain control.  · Your bleeding isn't excessive.  · You are able to care for your  and are emotionally stable.  Before leaving the hospital or birth center, youll be given written instructions for home self-care after vaginal delivery. Be sure to follow these instructions carefully. If you have questions or concerns, talk about them now.    If you have stitches  You may have received stitches in the skin near your vagina. The stitches might have closed an episiotomy (an incision that enlarges the opening of the vagina). Or you may have needed stitches to repair torn skin. Either way, your stitches should dissolve within weeks. Until then, you can help reduce discomfort, aid healing, and reduce your risk of infection by keeping the stitches clean. These tips can help:  · Gently wipe from front to back after you urinate or have a bowel movement.  · After wiping, spray warm water on the area. Or you can have a sitz bath. This means sitting in a tub with a few inches of water in it. Then pat the area dry or use a hairdryer on a cool setting.  · Do not use soap or any solution except water on the area.  · You can take a shower unless told not to.  · Change sanitary pads at least every 2 to 4  hours.  · Place cold or heat packs on the area as directed by your healthcare providers or nurses. Keep a thin towel between the pack and your skin.  · Sit on firm seats so the stitches pull less.     follow-up  Schedule a  follow-up exam with your healthcare provider for about 6 weeks after delivery. During this exam, your uterus and vaginal area will be checked. Contact your healthcare provider if you think you or your baby are having any problems.    When to call your healthcare provider  Call your health care provider right away if you have:  · A fever of 100.4°F (38.0°C) or higher  · Bleeding that requires a new sanitary pad after an hour, or large blood clots  · Pain in your vagina that gets worse and isn't relieved with medicine  · Swelling, discharge, or increased pain from vaginal tear or episiotomy  · Burning, pain, red streaks, or lumpy areas in your breasts that may be accompanied by flu-like symptoms  · Cracks, blisters, or blood on your nipples  · Burning or pain when you urinate  · Nausea or vomiting  · Dizziness or fainting  · Feelings of extreme sadness or anxiety, or a feeling that you dont want to be with your baby  · Abdominal pain that isnt relieved with medicine  · Vaginal discharge that has a bad odor  · No bowel movement for 5 days  · Painful urination, orinability to control urination  · Redness, warmth, or pain in the lower leg  · Chest pain   Date Last Reviewed: 2015  © 6017-3281 [employer]. [address] [place] [address], [place] [address]. All rights reserved. This information is not intended as a substitute for professional medical care. Always follow your healthcare professional's instructions.

## 2019-04-27 NOTE — PROGRESS NOTES
[facility] Unit  Obstetrics  Postpartum Progress Note    Patient Name: Teodora Weiner  MRN: [id number]  Admission Date: 2019  Hospital Length of Stay: 1 days  Attending Physician: April Brown MD  Primary Care Provider: Fariba Hudson MD    Subjective:     Principal Problem: (spontaneous vaginal delivery)    Hospital course: HD#1 Admitted for IOL; uncomplicated   HD#2/PPD#1 Routine PP care    Interval History:     She is doing well this morning. She is tolerating a regular diet without nausea or vomiting. She is voiding spontaneously. She is ambulating. She has passed flatus, and has not a BM. Vaginal bleeding is mild. She denies fever or chills. Abdominal pain is mild and controlled with oral medications. She is breastfeeding. She desires circumcision for her male baby: not applicable.    Objective:     Vital Signs (Most Recent):  Temp: 97.8 °F (36.6 °C) (19)  Pulse: 73 (19)  Resp: 17 (19)  BP: (!) 116/57 (19)  SpO2: 96 % (19) Vital Signs (24h Range):  Temp:  [96.8 °F (36 °C)-97.9 °F (36.6 °C)] 97.8 °F (36.6 °C)  Pulse:  [68-76] 73  Resp:  [17-18] 17  SpO2:  [96 %-97 %] 96 %  BP: (115-121)/(57-61) 116/57     Weight: 69.9 kg (154 lb)  Body mass index is 28.17 kg/m².    No intake or output data in the 24 hours ending 19    Significant Labs:  Lab Results   Component Value Date    GROUPTRH AB NEG 2019    HEPBSAG Negative 2018    STREPBCULT No Group B Streptococcus isolated 2019     Recent Labs   Lab 19  0544   HGB 11.4*   HCT 35.0*       I have personallly reviewed all pertinent lab results from the last 24 hours.    Physical Exam    Chest: Clear to auscultation   Breasts: Soft. Nontender. breast feeding. Cardiovascular: Regular rate and Rhythm. No tachycardia   Abd: normal size, well involuted, firm, non-tender   Vag: healing well, no significant drainage, no dehiscence, no  significant erythema. Lochia: minimal   Ext: No calf tenderness; Edema: absent        Assessment/Plan:     28 y.o. female  for:    *  (spontaneous vaginal delivery)  Routine PP care    Rubella non-immune status, antepartum  MMR post partum     Rh negative status during pregnancy  Rhogam work up        Disposition: As patient meets milestones, will plan to discharge tomorrow.    April Brown MD  Obstetrics  [facility]

## 2019-04-27 NOTE — HPI
Teodora Johansen Regine is a 28 y.o.  female  at 39w1d weeks gestation with an Estimated Date of Delivery: 19 who is admitted for induction of labor secondary to IUGR with fetal weight at 7%. She denies Decreased Fetal Movement, Leakage of Fluid, Vaginal Bleeding and Contractions. Fetal Movement: normal.

## 2019-04-29 ENCOUNTER — TELEPHONE (OUTPATIENT)
Dept: OBSTETRICS AND GYNECOLOGY | Facility: CLINIC | Age: 29
End: 2019-04-29

## 2019-04-29 NOTE — LETTER
April 29, 2019    Teodora Sarahteki  [address] [place] [address]           [facility]  [address] [facility] [address]  Phone: [phone] To whom it may concern,      Bertin Cuellar had travel arrangements with flights scheduled for the birth of their grandchild that was due on 5/1/19. Due to pregnancy complications of poor fetal growth it was medically necessary that their daughter, Teodora, be induced early on 4/25/19. It is my medical opinion that Bertin Cuellar be present for physical and mental support due to complications with pregnancy. If you have any questions or concerns, please don't hesitate to call.    Sincerely,    April Brown MD

## 2019-04-29 NOTE — TELEPHONE ENCOUNTER
Patient states that since she was induced 1 week early her mother had to change her flight. She states that the airlines will refund her money from original flight if she has a letter showing medical necessity from physician. She states that her mother/father is Bertin Cuellar, and letter should states that due to poor fetal growth it was medically necessary that the patient be induced early. Parents were needed for physical and mental support due to complications with pregnancy. Letter completed and patient notified.

## 2019-04-29 NOTE — TELEPHONE ENCOUNTER
----- Message from Anastasia Curtis sent at 4/29/2019  9:32 AM CDT -----  Contact: self  Teodora Weiner  MRN: [id number]  Home Phone      [phone]  Work Phone      Not on file.  Mobile          [phone]    Patient Care Team:  Fariba Hudson MD as PCP - General (Family Medicine)  Yudith Barcenas LPN as Care Coordinator  April Brown MD (Obstetrics and Gynecology)  OB? No  What phone number can you be reached at? [phone]  Message:  Pt states mother needs paperwork to give to the airport saying it was a medical necessity for her to change her flights where she can receive some of her money back. Please advise, thanks.

## 2019-04-30 ENCOUNTER — TELEPHONE (OUTPATIENT)
Dept: FAMILY MEDICINE | Facility: CLINIC | Age: 29
End: 2019-04-30

## 2019-04-30 NOTE — TELEPHONE ENCOUNTER
----- Message from Cee Hsieh sent at 2019  4:26 PM CDT -----  Contact: Self  Teodora Weiner  MRN: [id number]  : 1990  PCP: Fariba Hudson  Home Phone      [phone]  Work Phone      Not on file.  Mobile          [phone]      MESSAGE:   Pt called to speak to Ms. Bourgeois, please return call @ [phone]. Thanks.

## 2019-05-09 ENCOUNTER — POSTPARTUM VISIT (OUTPATIENT)
Dept: OBSTETRICS AND GYNECOLOGY | Facility: CLINIC | Age: 29
End: 2019-05-09
Payer: OTHER GOVERNMENT

## 2019-05-09 VITALS
RESPIRATION RATE: 15 BRPM | SYSTOLIC BLOOD PRESSURE: 110 MMHG | HEIGHT: 62 IN | BODY MASS INDEX: 25.91 KG/M2 | WEIGHT: 140.81 LBS | DIASTOLIC BLOOD PRESSURE: 72 MMHG | HEART RATE: 78 BPM

## 2019-05-09 PROCEDURE — 99999 PR PBB SHADOW E&M-EST. PATIENT-LVL III: ICD-10-PCS | Mod: PBBFAC,,, | Performed by: ADVANCED PRACTICE MIDWIFE

## 2019-05-09 PROCEDURE — 99999 PR PBB SHADOW E&M-EST. PATIENT-LVL III: CPT | Mod: PBBFAC,,, | Performed by: ADVANCED PRACTICE MIDWIFE

## 2019-05-09 PROCEDURE — 0503F POSTPARTUM CARE VISIT: CPT | Mod: ,,, | Performed by: ADVANCED PRACTICE MIDWIFE

## 2019-05-09 PROCEDURE — 99213 OFFICE O/P EST LOW 20 MIN: CPT | Mod: PBBFAC | Performed by: ADVANCED PRACTICE MIDWIFE

## 2019-05-09 PROCEDURE — 0503F PR POSTPARTUM CARE VISIT: ICD-10-PCS | Mod: ,,, | Performed by: ADVANCED PRACTICE MIDWIFE

## 2019-05-09 RX ORDER — ACETAMINOPHEN AND CODEINE PHOSPHATE 120; 12 MG/5ML; MG/5ML
1 SOLUTION ORAL DAILY
Qty: 30 TABLET | Refills: 11 | Status: SHIPPED | OUTPATIENT
Start: 2019-05-23 | End: 2019-05-10

## 2019-05-09 NOTE — PROGRESS NOTES
SUBJECTIVE:  Teodora Weiner is a 29 y.o.  female who presents for a postpartum visit. She is 2 weeks postpartum following a normal spontaneous vaginal delivery. Pregnancy complications: none. The delivery was at 39 gestational weeks. Postpartum course has been uneventful. She is feeling well and happy. Baby's course has been uneventful. Baby is feeding by breast. Bleeding thin lochia and brown. Bowel function is normal. Bladder function is normal. Patient is not sexually active. Contraception method is none. She plans to use oral progesterone-only contraceptive for contraception, RX to pharmacy and instructions to start in 2 weeks .  Postpartum depression screening: negative.    pregnancy #1 Problems (from 10/19/18 to present)     No problems associated with this episode.          I have fully reviewed the prenatal and intrapartum course.  The following portions of the patient's history were reviewed and updated as appropriate: allergies, current medications, past family history, past     OB History    Para Term  AB Living   1 1 1     1   SAB TAB Ectopic Multiple Live Births         0 1      # Outcome Date GA Lbr Flash/2nd Weight Sex Delivery Anes PTL Lv   1 Term 19 39w1d / 00:33 2.807 kg (6 lb 3 oz) F Vag-Spont EPI N CITLALY      Complications: IUGR (intrauterine growth restriction) affecting care of mother, third trimester, fetus 1       ROS:  GENERAL: No fever, chills, fatigability or weight loss.  VULVAR: No pain, no lesions and no itching.  VAGINAL: No relaxation, no itching, no discharge, no abnormal bleeding and no lesions.  ABDOMEN: No abdominal pain. Denies nausea. Denies vomiting. No diarrhea. No constipation  BREAST: Denies pain. No lumps. No discharge.  URINARY: No incontinence, no nocturia, no frequency and no dysuria.  CARDIOVASCULAR: No chest pain. No shortness of breath. No leg cramps.  NEUROLOGICAL: No headaches. No vision changes.    OBJECTIVE:   /72   Pulse 78    "Resp 15   Ht 5' 2" (1.575 m)   Wt 63.9 kg (140 lb 12.8 oz)   LMP 07/25/2018 The patient is currently breastfeeding.Body mass index is 25.75 kg/m².  Date of last Pap smear: 9/26/2018    General Appearance: alert, well appearing, in no apparent distress  Head: normocephalic, atraumatic  Abdomen: Deferred   Pelvic: Deferred denies  no redness or tenderness in the calves or thighs, no edema  Skin: normal coloration and turgor, no rashes  Neurological: alert, oriented, normal speech, no focal findings or movement disorder noted  Psych: full facial expressions          ASSESSMENT:  normal postpartum exam  No diagnosis found.    PLAN:    See orders and Patient Instructions  Contraceptive counseling for oral progesterone-only contraceptive    Anticipatory guidelines discussed and emergency precautions given, verbalized understanding.     Follow up in: 6 weeks or as needed.    "

## 2019-05-10 ENCOUNTER — OFFICE VISIT (OUTPATIENT)
Dept: FAMILY MEDICINE | Facility: CLINIC | Age: 29
End: 2019-05-10
Payer: OTHER GOVERNMENT

## 2019-05-10 VITALS
OXYGEN SATURATION: 98 % | SYSTOLIC BLOOD PRESSURE: 110 MMHG | WEIGHT: 140 LBS | BODY MASS INDEX: 25.76 KG/M2 | RESPIRATION RATE: 16 BRPM | DIASTOLIC BLOOD PRESSURE: 60 MMHG | HEIGHT: 62 IN | HEART RATE: 68 BPM

## 2019-05-10 DIAGNOSIS — Z00.00 WELL WOMAN EXAM (NO GYNECOLOGICAL EXAM): Primary | ICD-10-CM

## 2019-05-10 PROCEDURE — 99999 PR PBB SHADOW E&M-EST. PATIENT-LVL III: CPT | Mod: PBBFAC,,, | Performed by: FAMILY MEDICINE

## 2019-05-10 PROCEDURE — 99395 PREV VISIT EST AGE 18-39: CPT | Mod: S$PBB,,, | Performed by: FAMILY MEDICINE

## 2019-05-10 PROCEDURE — 99999 PR PBB SHADOW E&M-EST. PATIENT-LVL III: ICD-10-PCS | Mod: PBBFAC,,, | Performed by: FAMILY MEDICINE

## 2019-05-10 PROCEDURE — 99213 OFFICE O/P EST LOW 20 MIN: CPT | Mod: PBBFAC | Performed by: FAMILY MEDICINE

## 2019-05-10 PROCEDURE — 99395 PR PREVENTIVE VISIT,EST,18-39: ICD-10-PCS | Mod: S$PBB,,, | Performed by: FAMILY MEDICINE

## 2019-05-10 NOTE — PROGRESS NOTES
Subjective:       Patient ID: Teodora Weiner is a 29 y.o. female.    Chief Complaint: Follow-up    HPI  29 year old female comes in for health screening prior to moving to [place].    PMH, PSH, ALLERGIES, SH, FH reviewed in nurse's notes above  Medications reconciled in the nurse's notes      Review of Systems   Constitutional: Negative for activity change and unexpected weight change.   HENT: Negative for hearing loss, rhinorrhea and trouble swallowing.    Eyes: Negative for discharge and visual disturbance.   Respiratory: Negative for chest tightness and wheezing.    Cardiovascular: Negative for chest pain and palpitations.   Gastrointestinal: Negative for blood in stool, constipation, diarrhea and vomiting.   Endocrine: Negative for polydipsia and polyuria.   Genitourinary: Negative for difficulty urinating, dysuria, hematuria and menstrual problem.   Musculoskeletal: Negative for arthralgias, joint swelling and neck pain.   Neurological: Negative for weakness and headaches.   Psychiatric/Behavioral: Negative for confusion and dysphoric mood.       Objective:      Physical Exam   Constitutional: She is oriented to person, place, and time. She appears well-developed. No distress.   HENT:   Head: Normocephalic and atraumatic.   Eyes: Pupils are equal, round, and reactive to light. Conjunctivae are normal.   Neck: Normal range of motion. Neck supple. No thyromegaly present.   Cardiovascular: Normal rate, regular rhythm, normal heart sounds and intact distal pulses.   Pulmonary/Chest: Effort normal and breath sounds normal. No respiratory distress. She has no wheezes.   Abdominal: Soft. Bowel sounds are normal. There is no tenderness.   Musculoskeletal: Normal range of motion. She exhibits no edema.   Lymphadenopathy:     She has no cervical adenopathy.   Neurological: She is alert and oriented to person, place, and time.   Skin: Skin is warm and dry. No rash noted.   Psychiatric: She has a normal mood  and affect. Her behavior is normal.   Nursing note and vitals reviewed.       Assessment/Plan:       Problem List Items Addressed This Visit     None      Visit Diagnoses     Well woman exam (no gynecological exam)    -  Primary      paperwork filled out for transfer.    RTC if condition acutely worsens or any other concerns, otherwise RTC as scheduled

## 2019-05-10 NOTE — PATIENT INSTRUCTIONS
Prevention Guidelines, Women Ages 18 to 39  Screening tests and vaccines are an important part of managing your health. Health counseling is essential, too. Below are guidelines for these, for women ages 18 to 39. Talk with your healthcare provider to make sure youre up-to-date on what you need.  Screening Who needs it How often   Alcohol misuse All women in this age group At routine exams   Blood pressure All women in this age group Every 2 years if your blood pressure is less than 120/80 mm Hg; yearly if your systolic blood pressure is 120 to 139 mm Hg, or your diastolic blood pressure reading is 80 to 89 mm Hg   Breast cancer All women in this age group should talk with their healthcare providers about the need for clinical breast exams (CBE)1 Clinical breast exam every 3 years1   Cervical cancer Women ages 21 and older Women between ages 21 and 29 should have a Pap test every 3 years; women between ages 30 and 65 are advised to have a Pap test plus an HPV test every 5 years   Chlamydia Sexually active women ages 24 and younger, and women at increased risk for infection Every 3 years if you're at risk or have symptoms   Depression All women in this age group At routine exams   Diabetes mellitus, type 2 Adults with no symptoms who are overweight or obese and have 1 or more other risk factors for diabetes At least every 3 years   Gonorrhea Sexually active women at increased risk for infection At routine exams   Hepatitis C Anyone at increased risk At routine exams   HIV All women At routine exams   Obesity All women in this age group At routine exams   Syphilis Women at increased risk for infection - talk with your healthcare provider At routine exams   Tuberculosis Women at increased risk for infection - talk with your healthcare provider Ask your healthcare provider   Vision All women in this age group At least 1 complete exam in your 20s, and 2 in your 30s   Vaccine2 Who needs it How often   Chickenpox  (varicella) All women in this age group who have no record of this infection or vaccine 2 doses; the second dose should be given 4 to 8 weeks after the first dose   Hepatitis A Women at increased risk for infection - talk with your healthcare provider 2 doses given at least 6 months apart   Hepatitis B Women at increased risk for infection - talk with your healthcare provider 3 doses over 6 months; second dose should be given 1 month after the first dose; the third dose should be given at least 2 months after the second dose and at least 4 months after the first dose   Haemophilus influenzae Type B (HIB) Women at increased risk for infection - talk with your healthcare provider 1 to 3 doses   Human papillomavirus (HPV) All women in this age group up to age 26 3 doses; the second dose should be given 1 to 2 months after the first dose and the third dose given 6 months after the first dose   Influenza (flu) All women in this age group Once a year   Measles, mumps, rubella (MMR) All women in this age group who have no record of these infections or vaccines 1 or 2 doses   Meningococcal Women at increased risk for infection - talk with your healthcare provider 1 or more doses   Pneumococcal conjugate vaccine (PCV13) and pneumococcal polysaccharide vaccine (PPSV23) Women at increased risk for infection - talk with your healthcare provider PCV13: 1 dose ages 19 to 65 (protects against 13 types of pneumococcal bacteria)  PPSV23: 1 to 2 doses through age 64, or 1 dose at 65 or older (protects against 23 types of pneumococcal bacteria)      Tetanus/diphtheria/pertussis (Td/Tdap) booster All women in this age group Td every 10 years, or a one-time dose of Tdap instead of a Td booster after age 18, then Td every 10 years   Counseling Who needs it How often   BRCA gene mutation testing for breast and ovarian cancer susceptibility Women with increased risk for having gene mutation When your risk is known   Breast cancer and  chemoprevention Women at high risk for breast cancer When your risk is known   Diet and exercise Women who are overweight or obese When diagnosed, and then at routine exams   Domestic violence Women at the age in which they are able to have children At routine exams   Sexually transmitted infection prevention Women who are sexually active At routine exams   Skin cancer Prevention of skin cancer in fair-skinned adults through age 24 At routine exams   Use of tobacco and the health effects it can cause All women in this age group Every visit   1According to the ACS, women ages 20 to 39 years should have a clinical breast exam (CBE) as part of their routine health exam every 3 years. Breast self-exams are an option for women starting in their 20s. But the  USPSTF does not recommend CBE.  2Those who are 18 years old and not up-to-date on their childhood vaccines should get all appropriate catch-up vaccines recommended by the CDC.  Date Last Reviewed: 8/27/2015  © 8110-5610 [employer]. [address] [place] [address], [place] [address]. All rights reserved. This information is not intended as a substitute for professional medical care. Always follow your healthcare professional's instructions.

## 2019-05-14 ENCOUNTER — TELEPHONE (OUTPATIENT)
Dept: OBSTETRICS AND GYNECOLOGY | Facility: CLINIC | Age: 29
End: 2019-05-14

## 2019-05-14 NOTE — TELEPHONE ENCOUNTER
----- Message from Cindy Richard MA sent at 5/14/2019 10:18 AM CDT -----  Contact: Ej-Spouse      ----- Message -----  From: Cee Jori  Sent: 5/14/2019   9:34 AM  To: Kevin Chen Staff    Teodora Weiner  MRN: [id number]  Home Phone      [phone]  Work Phone      Not on file.  Mobile          [phone]    Patient Care Team:  Fariba Hudson MD as PCP - General (Family Medicine)  Yudith Barcenas LPN as Care Coordinator  April Brown MD (Obstetrics and Gynecology)  OB? No  What phone number can you be reached at? [phone]  Message:   Pts  would like to know if we have received their paperwork to get refunded on their plane tickets, please return call.

## 2019-05-22 ENCOUNTER — TELEPHONE (OUTPATIENT)
Dept: OBSTETRICS AND GYNECOLOGY | Facility: CLINIC | Age: 29
End: 2019-05-22

## 2019-05-22 NOTE — TELEPHONE ENCOUNTER
Pt calling with c/o hemorrhoids since delivery. Tried OTC treatment with no relief. Has started on colace. Please advise.

## 2019-05-22 NOTE — TELEPHONE ENCOUNTER
----- Message from Cindy Richard MA sent at 5/22/2019 11:11 AM CDT -----  Contact: Self      ----- Message -----  From: Gunjan Smith  Sent: 5/22/2019  11:00 AM  To: Kevin Chen Staff    Teodora Weiner  MRN: [id number]  Home Phone      [phone]  Work Phone      Not on file.  Mobile          [phone]    Patient Care Team:  Fariba Hudson MD as PCP - General (Family Medicine)  Yudith Barcenas LPN as Care Coordinator  April Brown MD (Obstetrics and Gynecology)  OB? No  What phone number can you be reached at? [phone]  Message:   Pt calling with questions regarding a hemorrhoid that is not going away. Please advise.

## 2019-06-06 ENCOUNTER — POSTPARTUM VISIT (OUTPATIENT)
Dept: OBSTETRICS AND GYNECOLOGY | Facility: CLINIC | Age: 29
End: 2019-06-06
Payer: OTHER GOVERNMENT

## 2019-06-06 VITALS
HEIGHT: 62 IN | WEIGHT: 136 LBS | DIASTOLIC BLOOD PRESSURE: 74 MMHG | HEART RATE: 70 BPM | SYSTOLIC BLOOD PRESSURE: 110 MMHG | BODY MASS INDEX: 25.03 KG/M2 | RESPIRATION RATE: 10 BRPM

## 2019-06-06 DIAGNOSIS — Z30.011 ENCOUNTER FOR INITIAL PRESCRIPTION OF CONTRACEPTIVE PILLS: ICD-10-CM

## 2019-06-06 DIAGNOSIS — Z01.30 BLOOD PRESSURE CHECK: ICD-10-CM

## 2019-06-06 DIAGNOSIS — Z13.31 DEPRESSION SCREENING NEGATIVE: ICD-10-CM

## 2019-06-06 PROCEDURE — 0503F POSTPARTUM CARE VISIT: CPT | Mod: ,,, | Performed by: OBSTETRICS & GYNECOLOGY

## 2019-06-06 PROCEDURE — 99213 OFFICE O/P EST LOW 20 MIN: CPT | Mod: PBBFAC | Performed by: OBSTETRICS & GYNECOLOGY

## 2019-06-06 PROCEDURE — 0503F PR POSTPARTUM CARE VISIT: ICD-10-PCS | Mod: ,,, | Performed by: OBSTETRICS & GYNECOLOGY

## 2019-06-06 PROCEDURE — 99999 PR PBB SHADOW E&M-EST. PATIENT-LVL III: ICD-10-PCS | Mod: PBBFAC,,, | Performed by: OBSTETRICS & GYNECOLOGY

## 2019-06-06 PROCEDURE — 99999 PR PBB SHADOW E&M-EST. PATIENT-LVL III: CPT | Mod: PBBFAC,,, | Performed by: OBSTETRICS & GYNECOLOGY

## 2019-06-06 RX ORDER — ACETAMINOPHEN AND CODEINE PHOSPHATE 120; 12 MG/5ML; MG/5ML
1 SOLUTION ORAL DAILY
Qty: 90 TABLET | Refills: 3 | Status: SHIPPED | OUTPATIENT
Start: 2019-06-06 | End: 2020-06-05

## 2019-06-06 NOTE — PROGRESS NOTES
CC: Post-partum follow-up    Teodora Weiner is a 29 y.o. female  presents for a postpartum visit.  She is status post   6 weeks ago.  Her hospitalization was not complicated.  She is breastfeeding.  She desires oral progesterone-only contraceptive for contraception.  She denies postpartum depression. She and the baby are doing well.  No pain.  No fever.   No bowel / bladder complaints. Pregnancy was complicated by: none    Her last pap was 2018      ROS:  GENERAL: No fever, chills, fatigability.  VULVAR: No pain, no lesions and no itching.  VAGINAL: No relaxation, no itching, no discharge, no abnormal bleeding and no lesions.  ABDOMEN: No abdominal pain. Denies nausea. Denies vomiting. No diarrhea. No constipation  BREAST: Denies pain. No lumps. No discharge.  URINARY: No incontinence, no nocturia, no frequency and no dysuria.  CARDIOVASCULAR: No chest pain. No shortness of breath. No leg cramps.  NEUROLOGICAL: No headaches. No vision changes.    History reviewed. No pertinent past medical history.  Past Surgical History:   Procedure Laterality Date    CYST REMOVAL       Review of patient's allergies indicates:  No Known Allergies    Current Outpatient Medications:     PNV,calcium 72-iron-folic acid (PRENATAL VITAMIN PLUS LOW IRON) 27 mg iron- 1 mg Tab, Take 1 tablet (1 each total) by mouth once daily., Disp: 30 tablet, Rfl: 11    hydrocortisone-pramoxine (PROCTOFOAM-HS) rectal foam, Place 1 applicator rectally 2 (two) times daily., Disp: 10 g, Rfl: 1      Vitals:    19 1040   BP: 110/74   Pulse: 70   Resp: 10         EXTERNAL GENITALIA POSTPARTUM: normal, well-healed, without lesions or masses, VAGINA POSTPARTUM: normal, well-healed, physiologic discharge, without lesions, CERVIX POSTPARTUM: normal, well-healed, without lesions, UTERUS POSTPARTUM: normal size, well involuted, firm, non-tender, ADNEXA POSTPARTUM: no masses palpable and nontender    Assessment:    1. Normal  postpartum exam  2. Doing well S/P       Plan:    1. Routine follow up.  2. Instructions / precautions reviewed  3. Contraceptive counseling; rx of micronor  4. May resume normal activities  5. Return: for annual exam

## 2019-07-29 PROBLEM — O36.5930 POOR FETAL GROWTH AFFECTING MANAGEMENT OF MOTHER IN THIRD TRIMESTER: Status: RESOLVED | Noted: 2019-04-09 | Resolved: 2019-07-29

## 2021-04-05 ENCOUNTER — PATIENT MESSAGE (OUTPATIENT)
Dept: ADMINISTRATIVE | Facility: HOSPITAL | Age: 31
End: 2021-04-05

## 2021-05-10 ENCOUNTER — PATIENT MESSAGE (OUTPATIENT)
Dept: RESEARCH | Facility: HOSPITAL | Age: 31
End: 2021-05-10

## 2021-07-06 ENCOUNTER — PATIENT MESSAGE (OUTPATIENT)
Dept: ADMINISTRATIVE | Facility: HOSPITAL | Age: 31
End: 2021-07-06

## 2022-02-10 ENCOUNTER — PATIENT MESSAGE (OUTPATIENT)
Dept: ADMINISTRATIVE | Facility: HOSPITAL | Age: 32
End: 2022-02-10
Payer: OTHER GOVERNMENT

## 2022-04-04 ENCOUNTER — PATIENT MESSAGE (OUTPATIENT)
Dept: ADMINISTRATIVE | Facility: HOSPITAL | Age: 32
End: 2022-04-04
Payer: OTHER GOVERNMENT

## 2024-08-19 ENCOUNTER — OFFICE VISIT (OUTPATIENT)
Dept: FAMILY MEDICINE | Facility: CLINIC | Age: 34
End: 2024-08-19
Payer: OTHER GOVERNMENT

## 2024-08-19 VITALS
HEART RATE: 92 BPM | HEIGHT: 62 IN | SYSTOLIC BLOOD PRESSURE: 124 MMHG | OXYGEN SATURATION: 98 % | DIASTOLIC BLOOD PRESSURE: 80 MMHG | BODY MASS INDEX: 27.81 KG/M2 | WEIGHT: 151.13 LBS

## 2024-08-19 DIAGNOSIS — Z82.0 FAMILY HISTORY OF MULTIPLE SCLEROSIS: ICD-10-CM

## 2024-08-19 DIAGNOSIS — Z00.00 PHYSICAL EXAM: Primary | ICD-10-CM

## 2024-08-19 DIAGNOSIS — M54.10 RADICULOPATHY, UNSPECIFIED SPINAL REGION: ICD-10-CM

## 2024-08-19 DIAGNOSIS — E55.9 VITAMIN D DEFICIENCY: ICD-10-CM

## 2024-08-19 DIAGNOSIS — Z79.899 HIGH RISK MEDICATION USE: ICD-10-CM

## 2024-08-19 DIAGNOSIS — R20.2 TINGLING IN EXTREMITIES: ICD-10-CM

## 2024-08-19 DIAGNOSIS — H11.30 SUBCONJUNCTIVAL HEMORRHAGE, UNSPECIFIED LATERALITY: ICD-10-CM

## 2024-08-19 PROCEDURE — 99204 OFFICE O/P NEW MOD 45 MIN: CPT | Mod: S$GLB,,, | Performed by: NURSE PRACTITIONER

## 2024-08-19 RX ORDER — MINERAL OIL AND WHITE PETROLATUM 30; 940 MG/G; MG/G
OINTMENT OPHTHALMIC NIGHTLY
Qty: 3.5 G | Refills: 0 | Status: SHIPPED | OUTPATIENT
Start: 2024-08-19

## 2024-08-19 NOTE — PROGRESS NOTES
SUBJECTIVE:    Patient ID: Teodora Soni is a 34 y.o. female.    Chief Complaint: Establish Care (No Bottles//no refills//denies ref to [facility])    34 year old female presents as new patient to establish care. New to area. No personal significant medical history. Today is presenting with complaints of  intermittent neck and leg tingling. Occurs throughout day. Does not seem to be related to activity or weight bearing. Lasts for a little while. Not sure how or why resolves. No previous injury or trauma.   No significant stress. Has subconjunctival hemorrhage. Denies cough. No trauma. No recent eye exam        Office Visit on 08/19/2024   Component Date Value Ref Range Status    MARTI 08/19/2024 NEGATIVE  NEGATIVE Final    Rheumatoid Factor 08/19/2024 <10  <14 IU/mL Final    WBC 08/19/2024 7.5  3.8 - 10.8 Thousand/uL Final    RBC 08/19/2024 4.64  3.80 - 5.10 Million/uL Final    Hemoglobin 08/19/2024 13.8  11.7 - 15.5 g/dL Final    Hematocrit 08/19/2024 42.8  35.0 - 45.0 % Final    MCV 08/19/2024 92.2  80.0 - 100.0 fL Final    MCH 08/19/2024 29.7  27.0 - 33.0 pg Final    MCHC 08/19/2024 32.2  32.0 - 36.0 g/dL Final    RDW 08/19/2024 12.3  11.0 - 15.0 % Final    Platelets 08/19/2024 248  140 - 400 Thousand/uL Final    MPV 08/19/2024 10.9  7.5 - 12.5 fL Final    Neutrophils, Abs 08/19/2024 4,838  1,500 - 7,800 cells/uL Final    Lymph # 08/19/2024 1,950  850 - 3,900 cells/uL Final    Mono # 08/19/2024 563  200 - 950 cells/uL Final    Eos # 08/19/2024 113  15 - 500 cells/uL Final    Baso # 08/19/2024 38  0 - 200 cells/uL Final    Neutrophils Relative 08/19/2024 64.5  % Final    Lymph % 08/19/2024 26.0  % Final    Mono % 08/19/2024 7.5  % Final    Eosinophil % 08/19/2024 1.5  % Final    Basophil % 08/19/2024 0.5  % Final    Glucose 08/19/2024 91  65 - 139 mg/dL Final    BUN 08/19/2024 10  7 - 25 mg/dL Final    Creatinine 08/19/2024 0.78  0.50 - 0.97 mg/dL Final    eGFR 08/19/2024 103  > OR = 60 mL/min/1.73m2  Final    BUN/Creatinine Ratio 08/19/2024 SEE NOTE:  6 - 22 (calc) Final    Sodium 08/19/2024 138  135 - 146 mmol/L Final    Potassium 08/19/2024 4.5  3.5 - 5.3 mmol/L Final    Chloride 08/19/2024 103  98 - 110 mmol/L Final    CO2 08/19/2024 25  20 - 32 mmol/L Final    Calcium 08/19/2024 9.9  8.6 - 10.2 mg/dL Final    Total Protein 08/19/2024 7.4  6.1 - 8.1 g/dL Final    Albumin 08/19/2024 5.1  3.6 - 5.1 g/dL Final    Globulin, Total 08/19/2024 2.3  1.9 - 3.7 g/dL (calc) Final    Albumin/Globulin Ratio 08/19/2024 2.2  1.0 - 2.5 (calc) Final    Total Bilirubin 08/19/2024 0.5  0.2 - 1.2 mg/dL Final    Alkaline Phosphatase 08/19/2024 33  31 - 125 U/L Final    AST 08/19/2024 20  10 - 30 U/L Final    ALT 08/19/2024 16  6 - 29 U/L Final    Cholesterol 08/19/2024 202 (H)  <200 mg/dL Final    HDL 08/19/2024 82  > OR = 50 mg/dL Final    Triglycerides 08/19/2024 69  <150 mg/dL Final    LDL Cholesterol 08/19/2024 104 (H)  mg/dL (calc) Final    HDL/Cholesterol Ratio 08/19/2024 2.5  <5.0 (calc) Final    Non HDL Chol. (LDL+VLDL) 08/19/2024 120  <130 mg/dL (calc) Final    TSH w/reflex to FT4 08/19/2024 1.40  mIU/L Final    Creatinine, Urine 08/19/2024 143  20 - 275 mg/dL Final    Microalb, Ur 08/19/2024 0.4  See Note: mg/dL Final    Microalb/Creat Ratio 08/19/2024 3  <30 mg/g creat Final    Color, UA 08/19/2024 DARK YELLOW  YELLOW Final    Appearance, UA 08/19/2024 CLEAR  CLEAR Final    Specific Gravity, UA 08/19/2024 1.024  1.001 - 1.035 Final    pH, UA 08/19/2024 6.5  5.0 - 8.0 Final    Glucose, UA 08/19/2024 NEGATIVE  NEGATIVE Final    Bilirubin, UA 08/19/2024 NEGATIVE  NEGATIVE Final    Ketones, UA 08/19/2024 TRACE (A)  NEGATIVE Final    Occult Blood UA 08/19/2024 NEGATIVE  NEGATIVE Final    Protein, UA 08/19/2024 NEGATIVE  NEGATIVE Final    Nitrite, UA 08/19/2024 NEGATIVE  NEGATIVE Final    Leukocytes, UA 08/19/2024 NEGATIVE  NEGATIVE Final    WBC Casts, UA 08/19/2024 NONE SEEN  < OR = 5 /HPF Final    RBC Casts, UA 08/19/2024  NONE SEEN  < OR = 2 /HPF Final    Squam Epithel, UA 08/19/2024 NONE SEEN  < OR = 5 /HPF Final    Bacteria, UA 08/19/2024 NONE SEEN  NONE SEEN /HPF Final    Hyaline Casts, UA 08/19/2024 NONE SEEN  NONE SEEN /LPF Final    Service Cmt: 08/19/2024 SEE COMMENT   Final    Reflexive Urine Culture 08/19/2024 SEE COMMENT   Final    Vitamin D, 1,25 (OH)2 08/19/2024 55  18 - 72 pg/mL Final    Vitamin D3, 1,25 (OH)2 08/19/2024 55  pg/mL Final    Vitamin D2, 1,25 (OH)2 08/19/2024 <8  pg/mL Final    Sed Rate 08/19/2024 2  < OR = 20 mm/h Final       History reviewed. No pertinent past medical history.  Social History     Socioeconomic History    Marital status:     Number of children: 0   Occupational History    Occupation:       Employer: [employer]   Tobacco Use    Smoking status: Never    Smokeless tobacco: Never   Substance and Sexual Activity    Alcohol use: Yes     Alcohol/week: 1.0 standard drink of alcohol     Types: 1 Glasses of wine per week    Drug use: No    Sexual activity: Yes     Partners: Male     Birth control/protection: None     Comment:    Social History Narrative    Sleep: 5-8  hours per night    Lmp: reg 4    Stay at home    Exercises-->cardio and weight training    Walks    Diet eats healhty     Social Determinants of Health     Financial Resource Strain: Low Risk  (8/12/2024)    Overall Financial Resource Strain (CARDIA)     Difficulty of Paying Living Expenses: Not hard at all   Food Insecurity: No Food Insecurity (8/12/2024)    Hunger Vital Sign     Worried About Running Out of Food in the Last Year: Never true     Ran Out of Food in the Last Year: Never true   Transportation Needs: No Transportation Needs (5/10/2019)    PRAPARE - Transportation     Lack of Transportation (Medical): No     Lack of Transportation (Non-Medical): No   Physical Activity: Sufficiently Active (8/12/2024)    Exercise Vital Sign     Days of Exercise per Week: 7 days     Minutes of  Exercise per Session: 40 min   Stress: Stress Concern Present (8/12/2024)    [employer] [place] [employer] - Occupational Stress Questionnaire     Feeling of Stress : To some extent   Housing Stability: Unknown (8/12/2024)    Housing Stability Vital Sign     Unable to Pay for Housing in the Last Year: No     Past Surgical History:   Procedure Laterality Date    CYST REMOVAL       Family History   Problem Relation Name Age of Onset    Asthma Mother Charlette     Hypertension Father [place]     Heart disease Father [place]     Alcohol abuse Father [place]     Diabetes Father [place]     Heart disease Brother Jean     Hypertension Brother Jean     No Known Problems Daughter      No Known Problems Son      Breast cancer Neg Hx      Colon cancer Neg Hx      Ovarian cancer Neg Hx         Tests to Keep You Healthy    Cervical Cancer Screening: DUE      Review of patient's allergies indicates:  No Known Allergies    Current Outpatient Medications:     norethindrone (ORTHO MICRONOR) 0.35 mg tablet, Take 1 tablet (0.35 mg total) by mouth once daily., Disp: 90 tablet, Rfl: 3    white petrolatum-mineral oiL (SYSTANE NIGHTTIME) 94-3 % Oint, Place into the right eye every evening., Disp: 3.5 g, Rfl: 0    Review of Systems   Constitutional:  Negative for activity change and unexpected weight change.   HENT:  Negative for hearing loss, rhinorrhea and trouble swallowing.    Eyes:  Positive for itching. Negative for discharge and visual disturbance.   Respiratory:  Negative for chest tightness and wheezing.    Cardiovascular:  Negative for chest pain and palpitations.   Gastrointestinal:  Negative for blood in stool, constipation, diarrhea and vomiting.   Endocrine: Negative for polydipsia and polyuria.   Genitourinary:  Negative for difficulty urinating, dysuria, hematuria and menstrual problem.   Musculoskeletal:  Negative for arthralgias, joint swelling and neck pain.   Neurological:  Negative for weakness and  "headaches.   Psychiatric/Behavioral:  Negative for confusion and dysphoric mood.           Objective:      Vitals:    08/19/24 1211   BP: 124/80   Pulse: 92   SpO2: 98%   Weight: 68.5 kg (151 lb 2 oz)   Height: 5' 2" (1.575 m)     Physical Exam  Vitals and nursing note reviewed.   Constitutional:       General: She is not in acute distress.     Appearance: Normal appearance. She is well-developed.   HENT:      Head: Normocephalic.      Right Ear: External ear normal.      Left Ear: External ear normal.   Eyes:      Conjunctiva/sclera: Conjunctivae normal.      Pupils: Pupils are equal, round, and reactive to light.   Neck:      Vascular: No JVD.   Cardiovascular:      Rate and Rhythm: Normal rate and regular rhythm.      Heart sounds: No murmur heard.  Pulmonary:      Effort: Pulmonary effort is normal.      Breath sounds: Normal breath sounds.   Abdominal:      General: Bowel sounds are normal.      Palpations: Abdomen is soft.   Musculoskeletal:         General: No deformity.      Cervical back: Normal range of motion and neck supple.      Lumbar back: Decreased range of motion.   Lymphadenopathy:      Cervical: No cervical adenopathy.   Skin:     General: Skin is warm and dry.      Findings: No rash.   Neurological:      Mental Status: She is alert and oriented to person, place, and time.      Gait: Gait normal.   Psychiatric:         Speech: Speech normal.         Behavior: Behavior normal.           Assessment:       1. Physical exam    2. High risk medication use    3. Radiculopathy, unspecified spinal region    4. Vitamin D deficiency    5. Tingling in extremities    6. Family history of multiple sclerosis    7. Subconjunctival hemorrhage, unspecified laterality         Plan:       Physical exam  Comments:  labs ordered to further investigate  Orders:  -     MARTI Screen w/Reflex; Future; Expected date: 08/19/2024  -     Rheumatoid Factor; Future; Expected date: 08/19/2024  -     Sedimentation rate; Future; " Expected date: 08/19/2024  -     CBC Auto Differential; Future; Expected date: 08/19/2024  -     Comprehensive Metabolic Panel; Future; Expected date: 08/19/2024  -     Lipid Panel; Future; Expected date: 08/19/2024  -     TSH w/reflex to FT4; Future; Expected date: 08/19/2024  -     Microalbumin/Creatinine Ratio, Urine; Future; Expected date: 08/19/2024  -     Urinalysis, Reflex to Urine Culture Urine, Clean Catch; Future; Expected date: 08/19/2024  -     Calcitriol; Future; Expected date: 08/19/2024    High risk medication use  -     MARTI Screen w/Reflex; Future; Expected date: 08/19/2024  -     Rheumatoid Factor; Future; Expected date: 08/19/2024  -     Sedimentation rate; Future; Expected date: 08/19/2024  -     CBC Auto Differential; Future; Expected date: 08/19/2024  -     Comprehensive Metabolic Panel; Future; Expected date: 08/19/2024  -     Lipid Panel; Future; Expected date: 08/19/2024  -     TSH w/reflex to FT4; Future; Expected date: 08/19/2024  -     Microalbumin/Creatinine Ratio, Urine; Future; Expected date: 08/19/2024  -     Urinalysis, Reflex to Urine Culture Urine, Clean Catch; Future; Expected date: 08/19/2024  -     Calcitriol; Future; Expected date: 08/19/2024    Radiculopathy, unspecified spinal region  Comments:  labs  Orders:  -     MARTI Screen w/Reflex; Future; Expected date: 08/19/2024  -     Rheumatoid Factor; Future; Expected date: 08/19/2024  -     Sedimentation rate; Future; Expected date: 08/19/2024    Vitamin D deficiency  Comments:  labs  Orders:  -     Calcitriol; Future; Expected date: 08/19/2024    Tingling in extremities  Comments:  refer to neurology  Orders:  -     Ambulatory referral/consult to Neurology; Future; Expected date: 08/26/2024    Family history of multiple sclerosis  Comments:  refer to neurology  Orders:  -     Ambulatory referral/consult to Neurology; Future; Expected date: 08/26/2024    Subconjunctival hemorrhage, unspecified  laterality  Comments:  systane    Other orders  -     white petrolatum-mineral oiL (SYSTANE NIGHTTIME) 94-3 % Oint; Place into the right eye every evening.  Dispense: 3.5 g; Refill: 0  -     Sedimentation Rate      Follow up in about 6 months (around 2/19/2025), or if symptoms worsen or fail to improve, for medication management.        8/26/2024 Anjelica Coker

## 2024-08-20 ENCOUNTER — PATIENT MESSAGE (OUTPATIENT)
Dept: FAMILY MEDICINE | Facility: CLINIC | Age: 34
End: 2024-08-20
Payer: OTHER GOVERNMENT

## 2024-08-22 LAB
1,25(OH)2D SERPL-MCNC: 55 PG/ML (ref 18–72)
1,25(OH)2D2 SERPL-MCNC: <8 PG/ML
1,25(OH)2D3 SERPL-MCNC: 55 PG/ML
ALBUMIN SERPL-MCNC: 5.1 G/DL (ref 3.6–5.1)
ALBUMIN/CREAT UR: 3 MG/G CREAT
ALBUMIN/GLOB SERPL: 2.2 (CALC) (ref 1–2.5)
ALP SERPL-CCNC: 33 U/L (ref 31–125)
ALT SERPL-CCNC: 16 U/L (ref 6–29)
ANA SER QL IF: NEGATIVE
APPEARANCE UR: CLEAR
AST SERPL-CCNC: 20 U/L (ref 10–30)
BACTERIA #/AREA URNS HPF: ABNORMAL /HPF
BACTERIA UR CULT: ABNORMAL
BASOPHILS # BLD AUTO: 38 CELLS/UL (ref 0–200)
BASOPHILS NFR BLD AUTO: 0.5 %
BILIRUB SERPL-MCNC: 0.5 MG/DL (ref 0.2–1.2)
BILIRUB UR QL STRIP: NEGATIVE
BUN SERPL-MCNC: 10 MG/DL (ref 7–25)
BUN/CREAT SERPL: NORMAL (CALC) (ref 6–22)
CALCIUM SERPL-MCNC: 9.9 MG/DL (ref 8.6–10.2)
CHLORIDE SERPL-SCNC: 103 MMOL/L (ref 98–110)
CHOLEST SERPL-MCNC: 202 MG/DL
CHOLEST/HDLC SERPL: 2.5 (CALC)
CO2 SERPL-SCNC: 25 MMOL/L (ref 20–32)
COLOR UR: ABNORMAL
CREAT SERPL-MCNC: 0.78 MG/DL (ref 0.5–0.97)
CREAT UR-MCNC: 143 MG/DL (ref 20–275)
EGFR: 103 ML/MIN/1.73M2
EOSINOPHIL # BLD AUTO: 113 CELLS/UL (ref 15–500)
EOSINOPHIL NFR BLD AUTO: 1.5 %
ERYTHROCYTE [DISTWIDTH] IN BLOOD BY AUTOMATED COUNT: 12.3 % (ref 11–15)
ERYTHROCYTE [SEDIMENTATION RATE] IN BLOOD BY WESTERGREN METHOD: 2 MM/H
GLOBULIN SER CALC-MCNC: 2.3 G/DL (CALC) (ref 1.9–3.7)
GLUCOSE SERPL-MCNC: 91 MG/DL (ref 65–139)
GLUCOSE UR QL STRIP: NEGATIVE
HCT VFR BLD AUTO: 42.8 % (ref 35–45)
HDLC SERPL-MCNC: 82 MG/DL
HGB BLD-MCNC: 13.8 G/DL (ref 11.7–15.5)
HGB UR QL STRIP: NEGATIVE
HYALINE CASTS #/AREA URNS LPF: ABNORMAL /LPF
KETONES UR QL STRIP: ABNORMAL
LDLC SERPL CALC-MCNC: 104 MG/DL (CALC)
LEUKOCYTE ESTERASE UR QL STRIP: NEGATIVE
LYMPHOCYTES # BLD AUTO: 1950 CELLS/UL (ref 850–3900)
LYMPHOCYTES NFR BLD AUTO: 26 %
MCH RBC QN AUTO: 29.7 PG (ref 27–33)
MCHC RBC AUTO-ENTMCNC: 32.2 G/DL (ref 32–36)
MCV RBC AUTO: 92.2 FL (ref 80–100)
MICROALBUMIN UR-MCNC: 0.4 MG/DL
MONOCYTES # BLD AUTO: 563 CELLS/UL (ref 200–950)
MONOCYTES NFR BLD AUTO: 7.5 %
NEUTROPHILS # BLD AUTO: 4838 CELLS/UL (ref 1500–7800)
NEUTROPHILS NFR BLD AUTO: 64.5 %
NITRITE UR QL STRIP: NEGATIVE
NONHDLC SERPL-MCNC: 120 MG/DL (CALC)
PH UR STRIP: 6.5 [PH] (ref 5–8)
PLATELET # BLD AUTO: 248 THOUSAND/UL (ref 140–400)
PMV BLD REES-ECKER: 10.9 FL (ref 7.5–12.5)
POTASSIUM SERPL-SCNC: 4.5 MMOL/L (ref 3.5–5.3)
PROT SERPL-MCNC: 7.4 G/DL (ref 6.1–8.1)
PROT UR QL STRIP: NEGATIVE
RBC # BLD AUTO: 4.64 MILLION/UL (ref 3.8–5.1)
RBC #/AREA URNS HPF: ABNORMAL /HPF
RHEUMATOID FACT SERPL-ACNC: <10 IU/ML
SERVICE CMNT-IMP: ABNORMAL
SODIUM SERPL-SCNC: 138 MMOL/L (ref 135–146)
SP GR UR STRIP: 1.02 (ref 1–1.03)
SQUAMOUS #/AREA URNS HPF: ABNORMAL /HPF
TRIGL SERPL-MCNC: 69 MG/DL
TSH SERPL-ACNC: 1.4 MIU/L
WBC # BLD AUTO: 7.5 THOUSAND/UL (ref 3.8–10.8)
WBC #/AREA URNS HPF: ABNORMAL /HPF

## 2024-08-27 ENCOUNTER — TELEPHONE (OUTPATIENT)
Dept: FAMILY MEDICINE | Facility: CLINIC | Age: 34
End: 2024-08-27
Payer: OTHER GOVERNMENT

## 2024-08-27 NOTE — TELEPHONE ENCOUNTER
Left detailed voice message regarding lab results.    Medication:    Outpatient Medications Marked as Taking for the 6/9/20 encounter (Refill) with CAROL Vergara   Medication Sig Dispense Refill   • diazePAM (VALIUM) 2 MG tablet Take 0.5-1 tablets by mouth every 8 hours as needed for Anxiety. 90 tablet 0       Message to Prescriber:     [] Pharmacy has been verified.    [] Patient completely out of medication (*Route encounter as high priority if checked)    [] Patient informed refill request can take up to 3 business days to be processed    Patient currently has follow up appointment scheduled

## 2024-09-09 ENCOUNTER — OFFICE VISIT (OUTPATIENT)
Dept: URGENT CARE | Facility: CLINIC | Age: 34
End: 2024-09-09
Payer: OTHER GOVERNMENT

## 2024-09-09 VITALS
DIASTOLIC BLOOD PRESSURE: 72 MMHG | OXYGEN SATURATION: 98 % | SYSTOLIC BLOOD PRESSURE: 129 MMHG | RESPIRATION RATE: 22 BRPM | TEMPERATURE: 99 F | HEIGHT: 62 IN | HEART RATE: 114 BPM | WEIGHT: 151 LBS | BODY MASS INDEX: 27.79 KG/M2

## 2024-09-09 DIAGNOSIS — T78.40XA ALLERGIC REACTION, INITIAL ENCOUNTER: Primary | ICD-10-CM

## 2024-09-09 DIAGNOSIS — L50.9 HIVES: ICD-10-CM

## 2024-09-09 PROCEDURE — 96372 THER/PROPH/DIAG INJ SC/IM: CPT | Mod: S$GLB,,,

## 2024-09-09 PROCEDURE — 99214 OFFICE O/P EST MOD 30 MIN: CPT | Mod: 25,S$GLB,,

## 2024-09-09 RX ORDER — DEXAMETHASONE SODIUM PHOSPHATE 4 MG/ML
8 INJECTION, SOLUTION INTRA-ARTICULAR; INTRALESIONAL; INTRAMUSCULAR; INTRAVENOUS; SOFT TISSUE
Status: COMPLETED | OUTPATIENT
Start: 2024-09-09 | End: 2024-09-09

## 2024-09-09 RX ORDER — FAMOTIDINE 20 MG/1
20 TABLET, FILM COATED ORAL 2 TIMES DAILY
Qty: 20 TABLET | Refills: 0 | Status: SHIPPED | OUTPATIENT
Start: 2024-09-09 | End: 2024-09-19

## 2024-09-09 RX ORDER — METHYLPREDNISOLONE 4 MG/1
TABLET ORAL
Qty: 21 EACH | Refills: 0 | Status: SHIPPED | OUTPATIENT
Start: 2024-09-09 | End: 2024-09-30

## 2024-09-09 RX ORDER — DIPHENHYDRAMINE HYDROCHLORIDE 50 MG/ML
50 INJECTION INTRAMUSCULAR; INTRAVENOUS
Status: COMPLETED | OUTPATIENT
Start: 2024-09-09 | End: 2024-09-09

## 2024-09-09 RX ORDER — CETIRIZINE HYDROCHLORIDE 10 MG/1
10 TABLET ORAL 2 TIMES DAILY
Qty: 20 TABLET | Refills: 0 | Status: SHIPPED | OUTPATIENT
Start: 2024-09-09 | End: 2024-09-19

## 2024-09-09 RX ADMIN — DEXAMETHASONE SODIUM PHOSPHATE 8 MG: 4 INJECTION, SOLUTION INTRA-ARTICULAR; INTRALESIONAL; INTRAMUSCULAR; INTRAVENOUS; SOFT TISSUE at 03:09

## 2024-09-09 RX ADMIN — DIPHENHYDRAMINE HYDROCHLORIDE 50 MG: 50 INJECTION INTRAMUSCULAR; INTRAVENOUS at 03:09

## 2024-09-09 NOTE — PATIENT INSTRUCTIONS
Pepcid and Zyrtec twice a day for 10 days as prescribed    Steroids as prescribed.  Start these tomorrow.    You can try oatmeal baths or cool compresses for relief of itching.

## 2024-09-09 NOTE — PROGRESS NOTES
"Subjective:      Patient ID: Teodora Soni is a 34 y.o. female.    Vitals:  height is 5' 2" (1.575 m) and weight is 68.5 kg (151 lb). Her oral temperature is 98.5 °F (36.9 °C). Her blood pressure is 129/72 and her pulse is 114 (abnormal). Her respiration is 22 (abnormal) and oxygen saturation is 98%.     Chief Complaint: Urticaria    34 y.o. female presents for evaluation of allergic reaction.  Symptoms began approximately 1 hour ago while cutting the grass. She denies any new fertilizer, pesticide or applications to the lawn.  Denies any new foods, medications, beauty products.  Only daily medication is vitamins when she was recently changed.  Hives or generalized throughout the entire body but started along groin area.Denies SOB or difficulty breathing. She does have scratching in her throat.     Urticaria  Pertinent negatives include no fatigue, fever or shortness of breath.       Constitution: Negative for chills, fatigue and fever.   Cardiovascular: Negative.    Respiratory: Negative.  Negative for shortness of breath, stridor, wheezing and asthma.    Musculoskeletal: Negative.    Skin:  Positive for erythema and hives.   Allergic/Immunologic: Positive for hives and itching. Negative for environmental allergies, seasonal allergies, food allergies and asthma.   Neurological: Negative.    Psychiatric/Behavioral: Negative.        Objective:     Physical Exam   Constitutional: She is oriented to person, place, and time. She appears well-developed.   HENT:   Head: Normocephalic and atraumatic. Head is without abrasion, without contusion and without laceration.   Ears:   Right Ear: External ear normal.   Left Ear: External ear normal.   Nose: Nose normal.   Mouth/Throat: Oropharynx is clear and moist and mucous membranes are normal.   Eyes: Conjunctivae, EOM and lids are normal. Pupils are equal, round, and reactive to light.   Neck: Trachea normal and phonation normal. Neck supple.   Cardiovascular: " Normal rate.   Pulmonary/Chest: Effort normal. No respiratory distress.   Musculoskeletal: Normal range of motion.         General: Normal range of motion.   Neurological: She is alert and oriented to person, place, and time. She displays no weakness.   Skin: Skin is warm, dry, intact and rash. Capillary refill takes less than 2 seconds. erythema No abrasion, No burn, No bruising and No ecchymosis   Psychiatric: Her speech is normal and behavior is normal. Mood, judgment and thought content normal.   Nursing note and vitals reviewed.      Assessment:     1. Allergic reaction, initial encounter    2. Hives        Plan:       Allergic reaction, initial encounter  -     dexAMETHasone injection 8 mg  -     diphenhydrAMINE injection 50 mg  -     famotidine (PEPCID) 20 MG tablet; Take 1 tablet (20 mg total) by mouth 2 (two) times daily. for 10 days  Dispense: 20 tablet; Refill: 0  -     cetirizine (ZYRTEC) 10 MG tablet; Take 1 tablet (10 mg total) by mouth 2 (two) times a day. for 10 days  Dispense: 20 tablet; Refill: 0  -     methylPREDNISolone (MEDROL DOSEPACK) 4 mg tablet; use as directed  Dispense: 21 each; Refill: 0    Hives      Decadron and diphenhydramine injections given in clinic.  Post-injection within 10 patient feels significant relief of symptoms.  She has a has been here to drive her home.  She acknowledges understanding of further medication management as well as red flags for ER.

## 2025-08-19 ENCOUNTER — TELEPHONE (OUTPATIENT)
Dept: FAMILY MEDICINE | Facility: CLINIC | Age: 35
End: 2025-08-19
Payer: OTHER GOVERNMENT

## 2025-08-19 DIAGNOSIS — Z00.00 ROUTINE GENERAL MEDICAL EXAMINATION AT A HEALTH CARE FACILITY: Primary | ICD-10-CM

## 2025-08-19 DIAGNOSIS — Z51.81 ENCOUNTER FOR THERAPEUTIC DRUG MONITORING: ICD-10-CM

## 2025-08-19 DIAGNOSIS — Z79.899 ENCOUNTER FOR LONG-TERM (CURRENT) USE OF HIGH-RISK MEDICATION: ICD-10-CM
